# Patient Record
Sex: FEMALE | Race: WHITE | NOT HISPANIC OR LATINO | Employment: FULL TIME | URBAN - METROPOLITAN AREA
[De-identification: names, ages, dates, MRNs, and addresses within clinical notes are randomized per-mention and may not be internally consistent; named-entity substitution may affect disease eponyms.]

---

## 2017-12-06 ENCOUNTER — TRANSCRIBE ORDERS (OUTPATIENT)
Dept: MAMMOGRAPHY | Facility: HOSPITAL | Age: 47
End: 2017-12-06

## 2017-12-06 DIAGNOSIS — Z12.31 VISIT FOR SCREENING MAMMOGRAM: Primary | ICD-10-CM

## 2017-12-28 ENCOUNTER — HOSPITAL ENCOUNTER (OUTPATIENT)
Dept: MAMMOGRAPHY | Facility: HOSPITAL | Age: 47
Discharge: HOME | End: 2017-12-28
Attending: OBSTETRICS & GYNECOLOGY | Admitting: OBSTETRICS & GYNECOLOGY

## 2017-12-28 DIAGNOSIS — Z12.31 VISIT FOR SCREENING MAMMOGRAM: ICD-10-CM

## 2017-12-28 PROCEDURE — 77063 BREAST TOMOSYNTHESIS BI: CPT

## 2017-12-28 PROCEDURE — 77067 SCR MAMMO BI INCL CAD: CPT | Performed by: RADIOLOGY

## 2017-12-28 PROCEDURE — G0202 SCR MAMMO BI INCL CAD: HCPCS

## 2017-12-28 PROCEDURE — 77063 BREAST TOMOSYNTHESIS BI: CPT | Performed by: RADIOLOGY

## 2018-12-14 ENCOUNTER — TRANSCRIBE ORDERS (OUTPATIENT)
Dept: MAMMOGRAPHY | Facility: HOSPITAL | Age: 48
End: 2018-12-14

## 2018-12-14 DIAGNOSIS — Z12.31 VISIT FOR SCREENING MAMMOGRAM: Primary | ICD-10-CM

## 2019-01-02 ENCOUNTER — HOSPITAL ENCOUNTER (OUTPATIENT)
Dept: MAMMOGRAPHY | Facility: HOSPITAL | Age: 49
Discharge: HOME | End: 2019-01-02
Attending: OBSTETRICS & GYNECOLOGY | Admitting: OBSTETRICS & GYNECOLOGY

## 2019-01-02 DIAGNOSIS — Z12.31 VISIT FOR SCREENING MAMMOGRAM: ICD-10-CM

## 2019-01-02 PROCEDURE — 77067 SCR MAMMO BI INCL CAD: CPT

## 2019-01-02 PROCEDURE — 77063 BREAST TOMOSYNTHESIS BI: CPT

## 2019-01-02 PROCEDURE — 77067 SCR MAMMO BI INCL CAD: CPT | Performed by: RADIOLOGY

## 2019-01-02 PROCEDURE — 77063 BREAST TOMOSYNTHESIS BI: CPT | Performed by: RADIOLOGY

## 2019-05-13 ENCOUNTER — OFFICE VISIT (OUTPATIENT)
Dept: GASTROENTEROLOGY | Facility: CLINIC | Age: 49
End: 2019-05-13

## 2019-05-13 VITALS
HEART RATE: 76 BPM | BODY MASS INDEX: 24.89 KG/M2 | HEIGHT: 68 IN | DIASTOLIC BLOOD PRESSURE: 89 MMHG | SYSTOLIC BLOOD PRESSURE: 158 MMHG | WEIGHT: 164.2 LBS

## 2019-05-13 DIAGNOSIS — R10.13 EPIGASTRIC PAIN: Primary | ICD-10-CM

## 2019-05-13 DIAGNOSIS — K58.2 IRRITABLE BOWEL SYNDROME WITH BOTH CONSTIPATION AND DIARRHEA: ICD-10-CM

## 2019-05-13 DIAGNOSIS — R11.0 NAUSEA: ICD-10-CM

## 2019-05-13 PROCEDURE — 99204 OFFICE O/P NEW MOD 45 MIN: CPT | Performed by: INTERNAL MEDICINE

## 2019-05-13 RX ORDER — LIFITEGRAST 50 MG/ML
SOLUTION/ DROPS OPHTHALMIC
COMMUNITY
Start: 2019-04-30

## 2019-05-13 RX ORDER — OMEPRAZOLE 20 MG/1
CAPSULE, DELAYED RELEASE ORAL
COMMUNITY
Start: 2019-03-12 | End: 2019-09-24 | Stop reason: SDUPTHER

## 2019-05-13 RX ORDER — MONTELUKAST SODIUM 10 MG/1
TABLET ORAL
COMMUNITY

## 2019-05-13 RX ORDER — AMLODIPINE BESYLATE 5 MG/1
TABLET ORAL
COMMUNITY
Start: 2019-02-27

## 2019-05-13 RX ORDER — LEVOCETIRIZINE DIHYDROCHLORIDE 5 MG/1
TABLET, FILM COATED ORAL
COMMUNITY
Start: 2019-04-15

## 2019-05-13 RX ORDER — HYOSCYAMINE SULFATE 0.12 MG/1
TABLET SUBLINGUAL
Qty: 90 EACH | Refills: 4 | Status: SHIPPED | OUTPATIENT
Start: 2019-05-13 | End: 2023-02-06

## 2019-05-13 NOTE — PROGRESS NOTES
"GASTROENTEROLOGY OFFICE NOTE  Agnieszka Fontenot  [id number]  1970    CARE TEAM  Patient Care Team:  Nagi Mathews MD as PCP - General (Internal Medicine)  Provider, No Known as PCP - Family Medicine    No ref. provider found     Chief Complaint   Patient presents with   • GI Problem   • Heartburn   • Diarrhea   • Constipation   • Nausea        HISTORY OF PRESENT ILLNESS:  48-year-old white female presents with primary complaints of abdominal fullness, indigestion, diarrhea, constipation and nausea.  She is referred by PCP for further work-up and evaluation    Patient is a fairly healthy 48-year-old female with history of hypertension and prior GI history of cholecystectomy as well as prior endoscopic studies.    She states she had a cholecystectomy 2011 and from time to time she gets which she considers is a \"flareup \".  These episodes are occurring about 1-2 times per month and are characterized by epigastric fullness and pressure in the lower abdomen followed by diarrhea.  She is also quite nauseated and then after these episodes resolve she will have no bowel movement for about 2 or 3 days.  She does have problems in the morning practically diarrhea and this seems to happen after she eats cereal.    She admits to being under quite a bit of stress her  had a cerebrovascular accident following benign intracranial tumor removal and his difficulty coping with this has impacted her and her family tremendously.    PAST MEDICAL HISTORY  Past Medical History:   Diagnosis Date   • Hypertension         PAST SURGICAL HISTORY  Past Surgical History:   Procedure Laterality Date   • CHOLECYSTECTOMY     • ENDOSCOPY     • TONSILLECTOMY     • WISDOM TOOTH EXTRACTION          MEDICATIONS:    Current Outpatient Medications:   •  Unable to find, 1 each 1 (One) Time. Med Name: Hydroeyes, Disp: , Rfl:   •  ALLERGY SERUM INJECTION, Allergy  injections - monthly, Disp: , Rfl:   •  amLODIPine (NORVASC) 5 MG tablet, , Disp: " ", Rfl:   •  levocetirizine (XYZAL) 5 MG tablet, , Disp: , Rfl:   •  montelukast (SINGULAIR) 10 MG tablet, Singulair 10 mg tablet  Daily, Disp: , Rfl:   •  omeprazole (priLOSEC) 20 MG capsule, , Disp: , Rfl:   •  XIIDRA 5 % solution, , Disp: , Rfl:     ALLERGIES  No Known Allergies    FAMILY HISTORY:  Family History   Problem Relation Age of Onset   • Breast cancer Paternal Aunt         UNK   • Heart disease Father    • Stroke Maternal Grandfather    • Heart attack Paternal Grandmother    • Ovarian cancer Neg Hx        SOCIAL HISTORY  Social History     Socioeconomic History   • Marital status:      Spouse name: Not on file   • Number of children: Not on file   • Years of education: Not on file   • Highest education level: Not on file   Tobacco Use   • Smoking status: Never Smoker   • Smokeless tobacco: Never Used   Substance and Sexual Activity   • Alcohol use: No     Frequency: Never   • Drug use: No   • Sexual activity: Defer     Socioeconomic History:  She is .  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  She is a non-smoker and nondrinker.       REVIEW OF SYSTEMS  Review of Systems   Constitutional: Negative for unexpected weight loss.   HENT: Negative for trouble swallowing.    Eyes: Negative.    Respiratory: Negative.    Gastrointestinal: Positive for constipation, diarrhea, GERD and indigestion. Negative for abdominal distention, abdominal pain, anal bleeding, blood in stool, nausea, rectal pain and vomiting.   Endocrine: Negative.    Genitourinary: Negative.    Musculoskeletal: Negative.    Skin: Negative.    Allergic/Immunologic: Negative.    Neurological: Negative.    Hematological: Negative.    Psychiatric/Behavioral: Negative.      I reviewed the above ROS.    PHYSICAL EXAM   /89 (BP Location: Right arm, Patient Position: Sitting, Cuff Size: Adult)   Pulse 76   Ht 172.7 cm (68\")   Wt 74.5 kg (164 lb 3.2 oz)   BMI 24.97 kg/m²   General: Alert and oriented x 3. In no apparent or " acute distress.  and No stigmata of chronic liver disease  HEENT: Anicteric slcera. Normal oropharynx  Neck: Supple. Without lymphadenopathy  CV: Regular rate and rhythm, S1, S2  Lungs: Clear to ausculation. Without rales, robchi and wheezing  Abdomen:  Soft,non-distended without palpable masses or hepatosplenomeagaly, areas of rebound tenderness or guarding.   Extremeties: without clubbing, cyanosis or edema  Neurologic:  Alert and oriented x 3 without focal motor or sensory deficits  Rectal exam: deferred     No results found for this or any previous visit.     Results Review:  I reviewed the patient's new clinical results.      ASSESSMENT  1.-  Irritable bowel syndrome.  Episodic.  Nonprogressive.  No evidence of alarm symptoms to suggest pathology of concern.  2.-  Personal stressors with secondary  anxiety  3.-  Episodic nausea      PLAN  1.-Conservative management  2.-Initiate a low FODMAP diet  3.-Levsin sublingual as needed 1-2 every 4 hours for symptoms  4.-Follow-up appointment in 3 months and sooner should any new problems develop.      I discussed the patients findings and my recommendations with patient    Efrain Talbot MD  5/13/2019   3:00 PM    Much of this note is an electronic transcription of spoken language to printed text. Electronic transcription of spoken language may permit erroneous, nonsensical word phrases to be inadvertently transcribed.  Although I have reviewed the note for these errors, some may still be present.

## 2019-05-15 PROBLEM — R11.0 NAUSEA: Status: ACTIVE | Noted: 2019-05-15

## 2019-05-15 PROBLEM — R10.13 EPIGASTRIC PAIN: Status: ACTIVE | Noted: 2019-05-15

## 2019-05-15 PROBLEM — K58.2 IRRITABLE BOWEL SYNDROME WITH BOTH CONSTIPATION AND DIARRHEA: Status: ACTIVE | Noted: 2019-05-15

## 2019-09-24 ENCOUNTER — OFFICE VISIT (OUTPATIENT)
Dept: GASTROENTEROLOGY | Facility: CLINIC | Age: 49
End: 2019-09-24

## 2019-09-24 VITALS
HEIGHT: 68 IN | DIASTOLIC BLOOD PRESSURE: 74 MMHG | BODY MASS INDEX: 25.67 KG/M2 | SYSTOLIC BLOOD PRESSURE: 122 MMHG | WEIGHT: 169.4 LBS | HEART RATE: 84 BPM

## 2019-09-24 DIAGNOSIS — K58.2 IRRITABLE BOWEL SYNDROME WITH BOTH CONSTIPATION AND DIARRHEA: Primary | ICD-10-CM

## 2019-09-24 DIAGNOSIS — R11.0 NAUSEA: ICD-10-CM

## 2019-09-24 DIAGNOSIS — R10.13 EPIGASTRIC PAIN: ICD-10-CM

## 2019-09-24 PROCEDURE — 99212 OFFICE O/P EST SF 10 MIN: CPT | Performed by: INTERNAL MEDICINE

## 2019-09-24 RX ORDER — OMEPRAZOLE 20 MG/1
20 CAPSULE, DELAYED RELEASE ORAL DAILY
Qty: 90 CAPSULE | Refills: 3 | Status: SHIPPED | OUTPATIENT
Start: 2019-09-24 | End: 2020-09-08

## 2019-09-24 RX ORDER — SIMVASTATIN 10 MG
TABLET ORAL
COMMUNITY
Start: 2019-08-28

## 2019-09-24 RX ORDER — DOXYCYCLINE HYCLATE 20 MG
TABLET ORAL
COMMUNITY
Start: 2019-09-18 | End: 2020-11-30 | Stop reason: SDUPTHER

## 2019-09-24 NOTE — PROGRESS NOTES
GASTROENTEROLOGY OFFICE NOTE  Agnieszka Fontenot  [id number]  1970    CARE TEAM     Patient Care Team:  Nagi Mathews MD as PCP - General (Internal Medicine)  Provider, No Known as PCP - Family Medicine    No ref. provider found     Chief Complaint   Patient presents with   • Follow-up   • Abdominal Pain   • Irritable Bowel Syndrome        HISTORY OF PRESENT ILLNESS:  Patient presents for follow-up.  She is doing better.  She discontinued Slim fast and feels that this was causing a lot of her abdominal pain and diarrhea.  Going lactose-free did not really help.  Currently she is only having occasional problems with diarrhea maybe 2 or 3 times per month but she still has some mild epigastric pain which occurs when she is supine practically after a meal.  Levsin sublingual did seem to help.    When we last saw her she had abdominal fullness, indigestion, diarrhea constipation and nausea and, as indicated above these have improved.  She has a remote history of cholecystectomy.  She does have quite a bit of stress at home with her 's medical condition following a cerebrovascular accident following benign intracranial tumor removal.    PAST MEDICAL HISTORY  Past Medical History:   Diagnosis Date   • Hypertension         PAST SURGICAL HISTORY  Past Surgical History:   Procedure Laterality Date   • CHOLECYSTECTOMY     • ENDOSCOPY     • TONSILLECTOMY     • WISDOM TOOTH EXTRACTION          MEDICATIONS:    Current Outpatient Medications:   •  Loteprednol Etabonate (LOTEMAX OP), Apply  to eye(s) as directed by provider., Disp: , Rfl:   •  ALLERGY SERUM INJECTION, Allergy  injections - monthly, Disp: , Rfl:   •  amLODIPine (NORVASC) 5 MG tablet, , Disp: , Rfl:   •  doxycycline (PERIOSTAT) 20 MG tablet, , Disp: , Rfl:   •  Hyoscyamine Sulfate SL (LEVSIN/SL) 0.125 MG sublingual tablet, Place 1 tablet under the tongue and allow to dissolve. Once every 4 hours as needed., Disp: 90 each, Rfl: 4  •  levocetirizine  "(XYZAL) 5 MG tablet, , Disp: , Rfl:   •  montelukast (SINGULAIR) 10 MG tablet, Singulair 10 mg tablet  Daily, Disp: , Rfl:   •  omeprazole (priLOSEC) 20 MG capsule, , Disp: , Rfl:   •  simvastatin (ZOCOR) 10 MG tablet, , Disp: , Rfl:   •  Unable to find, 1 each 1 (One) Time. Med Name: Hydroeyes, Disp: , Rfl:   •  XIIDRA 5 % solution, , Disp: , Rfl:     ALLERGIES  No Known Allergies    FAMILY HISTORY:  Family History   Problem Relation Age of Onset   • Breast cancer Paternal Aunt         UNK   • Heart disease Father    • Stroke Maternal Grandfather    • Heart attack Paternal Grandmother    • Ovarian cancer Neg Hx    • Colon cancer Neg Hx    • Colon polyps Neg Hx        SOCIAL HISTORY  Social History     Socioeconomic History   • Marital status:      Spouse name: Not on file   • Number of children: Not on file   • Years of education: Not on file   • Highest education level: Not on file   Tobacco Use   • Smoking status: Never Smoker   • Smokeless tobacco: Never Used   Substance and Sexual Activity   • Alcohol use: No     Frequency: Never   • Drug use: No   • Sexual activity: No     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  Non-smoker.  Nondrinker.       REVIEW OF SYSTEMS  Review of Systems   Constitutional: Negative for activity change, appetite change, chills, diaphoresis, fatigue, fever, unexpected weight gain and unexpected weight loss.   HENT: Negative for trouble swallowing and voice change.    Gastrointestinal: Positive for abdominal pain. Negative for abdominal distention, anal bleeding, blood in stool, constipation, diarrhea, nausea, rectal pain, vomiting, GERD and indigestion.     I reviewed the above noted review of systems.  F2    PHYSICAL EXAM   /74 (BP Location: Right arm, Patient Position: Sitting, Cuff Size: Adult)   Pulse 84   Ht 172.7 cm (68\")   Wt 76.8 kg (169 lb 6.4 oz)   BMI 25.76 kg/m²   General: Alert and oriented x 3. In no apparent or acute distress.  and No " stigmata of chronic liver disease  HEENT: Anicteric slcera. Normal oropharynx  Neck: Supple. Without lymphadenopathy  CV: Regular rate and rhythm, S1, S2  Lungs: Clear to ausculation. Without rales, robchi and wheezing  Abdomen:  Soft,non-distended without palpable masses or hepatosplenomeagaly, areas of rebound tenderness or guarding.   Extremeties: without clubbing, cyanosis or edema  Neurologic:  Alert and oriented x 3 without focal motor or sensory deficits  Rectal exam: deferred     No results found for this or any previous visit.     Results Review:  I reviewed the patient's new clinical results.      ASSESSMENT  1.-  Irritable bowel syndrome doing better with dietary modifications and avoidance of Slim fast.  2.-  Occasional abdominal bloating.  Again, low FODMAP diet is recommended  3.-  Episodic nausea currently not problematic.  3.-    PLAN  1.-  Low FODMAP diet  2.-  PRN use of sublingual Levsin  3.-  GI follow-up as needed for refractory/recurrent symptoms.      I discussed the patients findings and my recommendations with patient    Efrain Talbot MD  9/24/2019   3:28 PM    Much of this note is an electronic transcription of spoken language to printed text. Electronic transcription of spoken language may permit erroneous, nonsensical word phrases to be inadvertently transcribed.  Although I have reviewed the note for these errors, some may still be present.

## 2019-10-25 ENCOUNTER — TELEPHONE (OUTPATIENT)
Dept: GASTROENTEROLOGY | Facility: CLINIC | Age: 49
End: 2019-10-25

## 2019-10-25 NOTE — TELEPHONE ENCOUNTER
----- Message from Efrain Talbot MD sent at 10/24/2019  2:19 PM EDT -----  Any protein drink  ----- Message -----  From: Manju Preston MA  Sent: 10/24/2019  11:34 AM  To: Efrain Talbot MD    Patient cant remember what alternative you suggested for weight loss instead of the slim fast because she was pretty sure it caused her symptoms

## 2019-10-25 NOTE — TELEPHONE ENCOUNTER
Called and notified patient per  any protein drink is fine but one of the ones he uses is muscle milk. Patient confirmed she understood and had no additional questions.

## 2019-12-20 ENCOUNTER — TRANSCRIBE ORDERS (OUTPATIENT)
Dept: MAMMOGRAPHY | Facility: HOSPITAL | Age: 49
End: 2019-12-20

## 2019-12-20 DIAGNOSIS — Z12.31 VISIT FOR SCREENING MAMMOGRAM: Primary | ICD-10-CM

## 2020-01-07 ENCOUNTER — HOSPITAL ENCOUNTER (OUTPATIENT)
Dept: MAMMOGRAPHY | Facility: HOSPITAL | Age: 50
Discharge: HOME | End: 2020-01-07
Admitting: OBSTETRICS & GYNECOLOGY

## 2020-01-07 DIAGNOSIS — Z12.31 VISIT FOR SCREENING MAMMOGRAM: ICD-10-CM

## 2020-01-07 PROCEDURE — 77063 BREAST TOMOSYNTHESIS BI: CPT

## 2020-01-07 PROCEDURE — 77067 SCR MAMMO BI INCL CAD: CPT

## 2020-01-07 PROCEDURE — 77063 BREAST TOMOSYNTHESIS BI: CPT | Performed by: RADIOLOGY

## 2020-01-07 PROCEDURE — 77067 SCR MAMMO BI INCL CAD: CPT | Performed by: RADIOLOGY

## 2020-02-06 ENCOUNTER — HOSPITAL ENCOUNTER (OUTPATIENT)
Dept: ULTRASOUND IMAGING | Facility: HOSPITAL | Age: 50
Discharge: HOME | End: 2020-02-06

## 2020-02-06 ENCOUNTER — HOSPITAL ENCOUNTER (OUTPATIENT)
Dept: MAMMOGRAPHY | Facility: HOSPITAL | Age: 50
Discharge: HOME | End: 2020-02-06
Admitting: RADIOLOGY

## 2020-02-06 ENCOUNTER — TRANSCRIBE ORDERS (OUTPATIENT)
Dept: MAMMOGRAPHY | Facility: HOSPITAL | Age: 50
End: 2020-02-06

## 2020-02-06 DIAGNOSIS — R92.8 ABNORMAL MAMMOGRAM: ICD-10-CM

## 2020-02-06 DIAGNOSIS — R92.8 ABNORMAL MAMMOGRAM: Primary | ICD-10-CM

## 2020-02-06 PROCEDURE — 76642 ULTRASOUND BREAST LIMITED: CPT

## 2020-02-06 PROCEDURE — 76642 ULTRASOUND BREAST LIMITED: CPT | Performed by: RADIOLOGY

## 2020-02-06 PROCEDURE — G0279 TOMOSYNTHESIS, MAMMO: HCPCS

## 2020-02-06 PROCEDURE — 77062 BREAST TOMOSYNTHESIS BI: CPT | Performed by: RADIOLOGY

## 2020-02-06 PROCEDURE — 77066 DX MAMMO INCL CAD BI: CPT | Performed by: RADIOLOGY

## 2020-02-06 PROCEDURE — 77066 DX MAMMO INCL CAD BI: CPT

## 2020-08-10 ENCOUNTER — HOSPITAL ENCOUNTER (OUTPATIENT)
Dept: ULTRASOUND IMAGING | Facility: HOSPITAL | Age: 50
Discharge: HOME | End: 2020-08-10

## 2020-08-10 ENCOUNTER — HOSPITAL ENCOUNTER (OUTPATIENT)
Dept: MAMMOGRAPHY | Facility: HOSPITAL | Age: 50
Discharge: HOME | End: 2020-08-10
Admitting: OBSTETRICS & GYNECOLOGY

## 2020-08-10 DIAGNOSIS — R92.8 ABNORMAL MAMMOGRAM: ICD-10-CM

## 2020-08-10 PROCEDURE — 77065 DX MAMMO INCL CAD UNI: CPT

## 2020-08-10 PROCEDURE — 76642 ULTRASOUND BREAST LIMITED: CPT | Performed by: RADIOLOGY

## 2020-08-10 PROCEDURE — 77065 DX MAMMO INCL CAD UNI: CPT | Performed by: RADIOLOGY

## 2020-08-10 PROCEDURE — 76642 ULTRASOUND BREAST LIMITED: CPT

## 2020-09-08 RX ORDER — OMEPRAZOLE 20 MG/1
20 CAPSULE, DELAYED RELEASE ORAL DAILY
Qty: 90 CAPSULE | Refills: 0 | Status: SHIPPED | OUTPATIENT
Start: 2020-09-08 | End: 2023-02-06

## 2020-11-30 ENCOUNTER — OFFICE VISIT (OUTPATIENT)
Dept: OBSTETRICS AND GYNECOLOGY | Facility: CLINIC | Age: 50
End: 2020-11-30

## 2020-11-30 VITALS
BODY MASS INDEX: 24.86 KG/M2 | DIASTOLIC BLOOD PRESSURE: 72 MMHG | HEIGHT: 68 IN | WEIGHT: 164 LBS | SYSTOLIC BLOOD PRESSURE: 109 MMHG

## 2020-11-30 DIAGNOSIS — Z01.419 WOMEN'S ANNUAL ROUTINE GYNECOLOGICAL EXAMINATION: Primary | ICD-10-CM

## 2020-11-30 PROCEDURE — 99396 PREV VISIT EST AGE 40-64: CPT | Performed by: NURSE PRACTITIONER

## 2020-11-30 NOTE — PROGRESS NOTES
GYN Annual Exam     CC - Here for annual exam.        HPI  Agnieszka Fontenot is a 50 y.o. female, , who presents for annual well woman exam. Patient's last menstrual period was 2020..  Periods are irregular.  Dysmenorrhea:none.  Patient reports problems with: none. There were no changes to her medical or surgical history since her last visit.. Partner Status: Marital Status: .  New Partners since last visit: no.  Desires STD Screening: no.    She reports having longer lasting cycles, heavier flow starting on day 2, and back pain instead of dysmenorrhea. She states her cycles are irregular since she stopped BC 2 years ago.     Additional OB/GYN History   Current contraception: contraceptive methods: Abstinence  Desires to: do not start contraception  Last Pap : 2019  Last Completed Pap Smear       Status Date      PAP SMEAR Done 2019 negative        History of abnormal Pap smear: no  Family history of uterine, colon, breast, or ovarian cancer: yes - Paternal aunt had breast cancer  Performs monthly Self-Breast Exam: yes  Last mammogram: 08/10/2020  Last Completed Mammogram       Status Date      MAMMOGRAM Done 8/10/2020 MAMMO DIAGNOSTIC LEFT W CAD     Patient has more history with this topic...         Exercises Regularly: yes  Feelings of Anxiety or Depression: no  Tobacco Usage?: No   OB History        2    Para   2    Term   2            AB        Living   2       SAB        TAB        Ectopic        Molar        Multiple        Live Births                    Health Maintenance   Topic Date Due   • Annual Gynecologic Pelvic and Breast Exam  1970   • COLONOSCOPY  1970   • ANNUAL PHYSICAL  1975   • TDAP/TD VACCINES (1 - Tdap) 1991   • HEPATITIS C SCREENING  2019   • INFLUENZA VACCINE  2020   • LIPID PANEL  10/28/2020   • MAMMOGRAM  08/10/2021   • PAP SMEAR  2022   • Pneumococcal Vaccine 0-64  Aged Out       The additional following  "portions of the patient's history were reviewed and updated as appropriate: allergies, current medications, past family history, past medical history, past social history, past surgical history and problem list.    Review of Systems   Constitutional: Negative.    HENT: Negative.    Eyes: Negative.    Respiratory: Negative.    Cardiovascular: Negative.    Gastrointestinal: Negative.    Endocrine: Negative.    Genitourinary: Positive for menstrual problem.   Musculoskeletal: Negative.    Skin: Negative.    Allergic/Immunologic: Negative.    Neurological: Negative.    Hematological: Negative.    Psychiatric/Behavioral: Negative.      All other systems reviewed and are negative.     I have reviewed and agree with the HPI, ROS, and historical information as entered above. Heather Crane MA    Objective   Ht 172.7 cm (68\")   Wt 74.4 kg (164 lb)   LMP 11/17/2020   Breastfeeding No   BMI 24.94 kg/m²     Physical Exam  Exam conducted with a chaperone present.   Constitutional:       Appearance: Normal appearance. She is normal weight.   Cardiovascular:      Rate and Rhythm: Normal rate and regular rhythm.   Chest:      Breasts:         Right: Normal.         Left: Normal.   Genitourinary:     General: Normal vulva.      Vagina: Normal.      Cervix: Normal.      Uterus: Normal.       Adnexa: Right adnexa normal and left adnexa normal.      Rectum: Normal.   Neurological:      Mental Status: She is alert.            Assessment and Plan    Problem List Items Addressed This Visit     None          1. GYN annual well woman exam.   2. Reviewed monthly self breast exams.  Instructed to call with lumps, pain, or breast discharge.    3. Recommended use of Vitamin D replacement and getting adequate calcium in her diet. (1500mg)  4. Reviewed exercise as a preventative health measures.   5. Reccommended Flu Vaccine in Fall of each year.  6. Symptoms of menopausal transition reviewed with patient.   7. RTC in 1 year or PRN with " problems.   8. Will monitor periods for now. Anupama menopause discussed at length.     Heather Crane MA  11/30/2020

## 2020-12-07 DIAGNOSIS — Z01.419 WOMEN'S ANNUAL ROUTINE GYNECOLOGICAL EXAMINATION: ICD-10-CM

## 2021-06-30 ENCOUNTER — TRANSCRIBE ORDERS (OUTPATIENT)
Dept: ADMINISTRATIVE | Facility: HOSPITAL | Age: 51
End: 2021-06-30

## 2021-06-30 DIAGNOSIS — Z12.31 VISIT FOR SCREENING MAMMOGRAM: Primary | ICD-10-CM

## 2021-08-11 ENCOUNTER — HOSPITAL ENCOUNTER (OUTPATIENT)
Dept: MAMMOGRAPHY | Facility: HOSPITAL | Age: 51
Discharge: HOME | End: 2021-08-11
Admitting: RADIOLOGY

## 2021-08-11 ENCOUNTER — APPOINTMENT (OUTPATIENT)
Dept: MAMMOGRAPHY | Facility: HOSPITAL | Age: 51
End: 2021-08-11

## 2021-08-11 DIAGNOSIS — R92.8 ABNORMAL MAMMOGRAM: ICD-10-CM

## 2021-08-11 PROCEDURE — G0279 TOMOSYNTHESIS, MAMMO: HCPCS

## 2021-08-11 PROCEDURE — 77066 DX MAMMO INCL CAD BI: CPT | Performed by: RADIOLOGY

## 2021-08-11 PROCEDURE — 77066 DX MAMMO INCL CAD BI: CPT

## 2021-08-11 PROCEDURE — 77062 BREAST TOMOSYNTHESIS BI: CPT | Performed by: RADIOLOGY

## 2022-01-05 ENCOUNTER — OFFICE VISIT (OUTPATIENT)
Dept: OBSTETRICS AND GYNECOLOGY | Facility: CLINIC | Age: 52
End: 2022-01-05

## 2022-01-05 VITALS — DIASTOLIC BLOOD PRESSURE: 60 MMHG | WEIGHT: 161.8 LBS | BODY MASS INDEX: 24.6 KG/M2 | SYSTOLIC BLOOD PRESSURE: 102 MMHG

## 2022-01-05 DIAGNOSIS — Z01.419 ENCOUNTER FOR ANNUAL ROUTINE GYNECOLOGICAL EXAMINATION: Primary | ICD-10-CM

## 2022-01-05 PROCEDURE — 99396 PREV VISIT EST AGE 40-64: CPT | Performed by: NURSE PRACTITIONER

## 2022-01-05 RX ORDER — METHOCARBAMOL 750 MG/1
TABLET, FILM COATED ORAL EVERY 8 HOURS SCHEDULED
COMMUNITY
End: 2023-02-06

## 2022-01-05 NOTE — PROGRESS NOTES
GYN Annual Exam     CC - Here for annual exam.        HPI  Agnieszka Fontenot is a 51 y.o. female, , who presents for annual well woman exam. Patient's last menstrual period was 10/31/2021 (approximate). Periods are irregular and sporadic.  Dysmenorrhea:none.  Patient reports problems with: none. There were no changes to her medical or surgical history since her last visit.. Partner Status: Marital Status: .  New Partners since last visit: no.  Desires STD Screening: no.    Additional OB/GYN History   Current contraception: contraceptive methods: Abstinence  Desires to: do not start contraception  Last Pap : 2020- pap w/ reflex- negative  Last Completed Pap Smear          Ordered - PAP SMEAR (Every 3 Years) Ordered on 2020  Pap IG, Rfx HPV ASCU    2019  Done - negative              History of abnormal Pap smear: no  Family history of uterine, colon, breast, or ovarian cancer: yes - PAunt- Breast  Performs monthly Self-Breast Exam: intermittently  Last mammogram: 2021. Done at [facility].    Last Completed Mammogram          MAMMOGRAM (Yearly) Next due on 2021  Mammo Diagnostic Digital Tomosynthesis Bilateral With CAD    08/10/2020  Mammo Diagnostic Left With CAD    2020  Mammo Diagnostic Digital Tomosynthesis Bilateral With CAD    2020  Mammo Screening Digital Tomosynthesis Bilateral With CAD    2019  Mammo Screening Digital Tomosynthesis Bilateral With CAD    Only the first 5 history entries have been loaded, but more history exists.               Exercises Regularly: yes  Feelings of Anxiety or Depression: no  Tobacco Usage?: No   OB History        2    Para   2    Term   2            AB        Living   2       SAB        IAB        Ectopic        Molar        Multiple        Live Births                    Health Maintenance   Topic Date Due   • COLORECTAL CANCER SCREENING  Never done   • ANNUAL PHYSICAL  Never  done   • TDAP/TD VACCINES (1 - Tdap) Never done   • HEPATITIS C SCREENING  Never done   • LIPID PANEL  Never done   • INFLUENZA VACCINE  Never done   • Annual Gynecologic Pelvic and Breast Exam  12/01/2021   • MAMMOGRAM  08/11/2022   • PAP SMEAR  12/07/2023   • COVID-19 Vaccine  Completed   • Pneumococcal Vaccine 0-64  Aged Out       The additional following portions of the patient's history were reviewed and updated as appropriate: allergies, current medications, past family history, past medical history, past social history, past surgical history and problem list.    Review of Systems   Constitutional: Negative.    Cardiovascular: Negative.    Gastrointestinal: Negative.    Genitourinary: Positive for menstrual problem (irregular periods).   Musculoskeletal: Negative.    Psychiatric/Behavioral: Negative.          I have reviewed and agree with the HPI, ROS, and historical information as entered above. Marilou Stanford, APRN    Objective   /60   Wt 73.4 kg (161 lb 12.8 oz)   LMP 10/31/2021 (Approximate)   Breastfeeding No   BMI 24.60 kg/m²     Physical Exam  Vitals and nursing note reviewed. Exam conducted with a chaperone present.   Constitutional:       Appearance: She is well-developed and normal weight.   HENT:      Head: Normocephalic and atraumatic.   Neck:      Thyroid: No thyroid mass or thyromegaly.   Cardiovascular:      Rate and Rhythm: Normal rate and regular rhythm.      Heart sounds: No murmur heard.      Pulmonary:      Effort: Pulmonary effort is normal. No retractions.      Breath sounds: Normal breath sounds. No wheezing, rhonchi or rales.   Chest:      Chest wall: No mass or tenderness.   Breasts:      Right: Normal. No mass, nipple discharge, skin change or tenderness.      Left: Normal. No mass, nipple discharge, skin change or tenderness.       Abdominal:      Palpations: Abdomen is soft. Abdomen is not rigid. There is no mass.      Tenderness: There is no guarding.      Hernia: No  hernia is present.   Genitourinary:     General: Normal vulva.      Labia:         Right: No rash, tenderness or lesion.         Left: No rash, tenderness or lesion.       Vagina: Normal. No vaginal discharge or lesions.      Cervix: No cervical motion tenderness, discharge, lesion or cervical bleeding.      Uterus: Normal. Not enlarged, not fixed and not tender.       Adnexa: Right adnexa normal and left adnexa normal.        Right: No mass or tenderness.          Left: No mass or tenderness.        Rectum: Normal. No external hemorrhoid.   Musculoskeletal:      Cervical back: Normal range of motion. No muscular tenderness.   Neurological:      Mental Status: She is alert and oriented to person, place, and time.   Psychiatric:         Behavior: Behavior normal.            Assessment and Plan    Problem List Items Addressed This Visit     None      Visit Diagnoses     Encounter for annual routine gynecological examination    -  Primary    Relevant Orders    Pap IG, HPV-hr          1. GYN annual well woman exam.  2. Next mammogram due after 8/11/22  3. Discussed screening colonoscopy. Plans to have this done after turns age 52. Will call for an order for this after her [birth year] in 7/2020.  4. Will monitor periods for now. Perimenopause and menopause symptoms reviewed.    5. Reviewed monthly self breast exams.  Instructed to call with lumps, pain, or breast discharge.    6. Reviewed exercise as a preventative health measures.   7. Reccommended Flu Vaccine in Fall of each year.  8. RTC in 1 year or PRN with problems.      Marilou Stanford, GRISELDA  01/05/2022

## 2022-01-14 DIAGNOSIS — Z01.419 ENCOUNTER FOR ANNUAL ROUTINE GYNECOLOGICAL EXAMINATION: ICD-10-CM

## 2022-10-21 ENCOUNTER — TRANSCRIBE ORDERS (OUTPATIENT)
Dept: ADMINISTRATIVE | Facility: HOSPITAL | Age: 52
End: 2022-10-21

## 2022-10-21 DIAGNOSIS — Z12.31 VISIT FOR SCREENING MAMMOGRAM: Primary | ICD-10-CM

## 2022-10-27 ENCOUNTER — APPOINTMENT (OUTPATIENT)
Dept: MAMMOGRAPHY | Facility: HOSPITAL | Age: 52
End: 2022-10-27

## 2022-12-02 ENCOUNTER — HOSPITAL ENCOUNTER (OUTPATIENT)
Dept: MAMMOGRAPHY | Facility: HOSPITAL | Age: 52
Discharge: HOME | End: 2022-12-02
Admitting: NURSE PRACTITIONER

## 2022-12-02 DIAGNOSIS — Z12.31 VISIT FOR SCREENING MAMMOGRAM: ICD-10-CM

## 2022-12-02 PROCEDURE — 77063 BREAST TOMOSYNTHESIS BI: CPT | Performed by: RADIOLOGY

## 2022-12-02 PROCEDURE — 77063 BREAST TOMOSYNTHESIS BI: CPT

## 2022-12-02 PROCEDURE — 77067 SCR MAMMO BI INCL CAD: CPT | Performed by: RADIOLOGY

## 2022-12-02 PROCEDURE — 77067 SCR MAMMO BI INCL CAD: CPT

## 2023-02-06 ENCOUNTER — OFFICE VISIT (OUTPATIENT)
Dept: OBSTETRICS AND GYNECOLOGY | Facility: CLINIC | Age: 53
End: 2023-02-06
Payer: COMMERCIAL

## 2023-02-06 VITALS
SYSTOLIC BLOOD PRESSURE: 104 MMHG | WEIGHT: 168.47 LBS | DIASTOLIC BLOOD PRESSURE: 62 MMHG | BODY MASS INDEX: 25.62 KG/M2

## 2023-02-06 DIAGNOSIS — Z01.419 ROUTINE GYNECOLOGICAL EXAMINATION: Primary | ICD-10-CM

## 2023-02-06 PROCEDURE — 99396 PREV VISIT EST AGE 40-64: CPT | Performed by: NURSE PRACTITIONER

## 2023-02-06 RX ORDER — CIMETIDINE 300 MG/1
TABLET, FILM COATED ORAL
COMMUNITY
Start: 2023-01-15

## 2023-02-06 NOTE — PROGRESS NOTES
Gynecologic Annual Exam Note          GYN Annual Exam     Annual        Subjective     HPI  Agnieszka Fontenot is a 52 y.o. female, , who presents for annual well woman exam as a established patient . Patient's last menstrual period was 10/03/2022 (exact date)..  Patient reports problems with: none.  Her menses are rare, and sporadic. She reports dysmenorrhea is none. Partner Status: Marital Status: . She is is not currently sexually active. STD testing recommendations have been explained to the patient and she does not desire STD testing. There were no changes to her medical or surgical history since her last visit..  LMP 10/2022.  Prior to that she has skipped a few periods throughout .           Additional OB/GYN History   Current contraception: contraceptive methods: Abstinence  Desires to: do not start contraception    Last Pap : 2022. Result: negative. HPV: negative.   Last Completed Pap Smear          PAP SMEAR (Every 3 Years) Next due on 2022  SCANNED - PAP SMEAR    2020  Pap IG, Rfx HPV ASCU    2019  Done - negative              History of abnormal Pap smear: no  Family history of uterine, colon, breast, or ovarian cancer: yes - Paternal aunt- breast  Performs monthly Self-Breast Exam: yes  Last mammogram: 2022. Done at [facility].    Last Completed Mammogram          MAMMOGRAM (Yearly) Next due on 2022  Mammo Screening Digital Tomosynthesis Bilateral With CAD    2021  Mammo Diagnostic Digital Tomosynthesis Bilateral With CAD    08/10/2020  Mammo Diagnostic Left With CAD    2020  Mammo Diagnostic Digital Tomosynthesis Bilateral With CAD    2020  Mammo Screening Digital Tomosynthesis Bilateral With CAD    Only the first 5 history entries have been loaded, but more history exists.                History of abnormal mammogram: yes    Colonoscopy: has never had a colonoscopy.   Exercises Regularly: yes  Feelings  of Anxiety or Depression: no  Tobacco Usage?: No       Current Outpatient Medications:   •  ALLERGY SERUM INJECTION, Allergy  injections - monthly, Disp: , Rfl:   •  amLODIPine (NORVASC) 5 MG tablet, , Disp: , Rfl:   •  cimetidine (TAGAMET) 300 MG tablet, , Disp: , Rfl:   •  levocetirizine (XYZAL) 5 MG tablet, , Disp: , Rfl:   •  montelukast (SINGULAIR) 10 MG tablet, Singulair 10 mg tablet  Daily, Disp: , Rfl:   •  simvastatin (ZOCOR) 10 MG tablet, , Disp: , Rfl:   •  XIIDRA 5 % solution, , Disp: , Rfl:      Patient denies the need for medication refills today.    OB History        2    Para   2    Term   2            AB        Living   2       SAB        IAB        Ectopic        Molar        Multiple        Live Births                    Past Medical History:   Diagnosis Date   • History of degenerative disc disease    • History of mammogram 2019-negative   • Hypercholesterolemia    • Hypertension    • Indigestion     dx with dyspepsia   • Papanicolaou smear 2018-negative   • Respiratory disorder     reactive airway disease   • Scoliosis    • Screening breast examination     self admits   • Visit for oral contraceptive prescription 2011        Past Surgical History:   Procedure Laterality Date   • BACK SURGERY  1998    blood transfusion, Kevan Rods   •  SECTION     • CHOLECYSTECTOMY  10/2011    no comp   • ENDOSCOPY      EGD   • MOUTH SURGERY     • TONSILLECTOMY     • WISDOM TOOTH EXTRACTION         Health Maintenance   Topic Date Due   • COLORECTAL CANCER SCREENING  Never done   • TDAP/TD VACCINES (1 - Tdap) Never done   • HEPATITIS C SCREENING  Never done   • ANNUAL PHYSICAL  Never done   • LIPID PANEL  Never done   • COVID-19 Vaccine (4 - Booster for Pfizer series) 2021   • ZOSTER VACCINE (1 of 2) Never done   • Annual Gynecologic Pelvic and Breast Exam  2023   • MAMMOGRAM  2023   • PAP SMEAR  2025   • INFLUENZA  VACCINE  Completed   • Pneumococcal Vaccine 0-64  Aged Out       The additional following portions of the patient's history were reviewed and updated as appropriate: allergies, current medications, past family history, past medical history, past social history, past surgical history and problem list.    Review of Systems   Constitutional: Negative.    Cardiovascular: Negative.    Gastrointestinal: Negative.    Genitourinary: Negative.    Psychiatric/Behavioral: Negative.          I have reviewed and agree with the HPI, ROS, and historical information as entered above. Marilou Stanford, APRN      Objective   /62   Wt 76.4 kg (168 lb 7.5 oz)   LMP 10/03/2022 (Exact Date)   BMI 25.62 kg/m²     Physical Exam  Vitals and nursing note reviewed. Exam conducted with a chaperone present.   Constitutional:       Appearance: She is well-developed.   HENT:      Head: Normocephalic and atraumatic.   Neck:      Thyroid: No thyroid mass or thyromegaly.   Cardiovascular:      Rate and Rhythm: Normal rate and regular rhythm.      Heart sounds: No murmur heard.  Pulmonary:      Effort: Pulmonary effort is normal. No retractions.      Breath sounds: Normal breath sounds. No wheezing, rhonchi or rales.   Chest:      Chest wall: No mass or tenderness.   Breasts:     Breasts are asymmetrical.      Right: Normal. No mass, nipple discharge, skin change or tenderness.      Left: Normal. No mass, nipple discharge, skin change or tenderness.   Abdominal:      Palpations: Abdomen is soft. Abdomen is not rigid. There is no mass.      Tenderness: There is no abdominal tenderness. There is no guarding.      Hernia: No hernia is present.   Genitourinary:     General: Normal vulva.      Labia:         Right: No rash, tenderness or lesion.         Left: No rash, tenderness or lesion.       Urethra: No prolapse, urethral pain, urethral swelling or urethral lesion.      Vagina: Normal. No vaginal discharge or lesions.      Cervix: No cervical  motion tenderness, discharge, lesion or cervical bleeding.      Uterus: Normal. Not enlarged, not fixed and not tender.       Adnexa: Right adnexa normal and left adnexa normal.        Right: No mass or tenderness.          Left: No mass or tenderness.        Rectum: Normal. No external hemorrhoid.   Musculoskeletal:      Cervical back: Normal range of motion. No muscular tenderness.   Neurological:      Mental Status: She is alert and oriented to person, place, and time.   Psychiatric:         Behavior: Behavior normal.            Assessment and Plan    Problem List Items Addressed This Visit    None  Visit Diagnoses     Routine gynecological examination    -  Primary          1. GYN annual well woman exam.   2. Age 52, discussed screening colonoscopy recommendation  3. Had normal mammogram 12/2022  4. Perimenopausal, LMP 10/2022  5. Pap guidelines reviewed.  6. Reviewed monthly self breast exams.  Instructed to call with lumps, pain, or breast discharge.    7. Recommended use of Vitamin D replacement and getting adequate calcium in her diet. (1500mg)  8. Reviewed exercise as a preventative health measures.   9. Reccommended Flu Vaccine in Fall of each year.  10. RTC in 1 year or PRN with problems.       Marilou Stanford, APRN  02/06/2023

## 2023-05-22 RX ORDER — SODIUM, POTASSIUM,MAG SULFATES 17.5-3.13G
2 SOLUTION, RECONSTITUTED, ORAL ORAL TAKE AS DIRECTED
Qty: 354 ML | Refills: 0 | Status: SHIPPED | OUTPATIENT
Start: 2023-05-22

## 2023-05-26 ENCOUNTER — OUTSIDE FACILITY SERVICE (OUTPATIENT)
Dept: GASTROENTEROLOGY | Facility: CLINIC | Age: 53
End: 2023-05-26

## 2023-05-26 PROCEDURE — 88305 TISSUE EXAM BY PATHOLOGIST: CPT

## 2023-05-26 PROCEDURE — 45385 COLONOSCOPY W/LESION REMOVAL: CPT | Performed by: INTERNAL MEDICINE

## 2023-05-30 ENCOUNTER — LAB REQUISITION (OUTPATIENT)
Dept: LAB | Facility: HOSPITAL | Age: 53
End: 2023-05-30
Payer: COMMERCIAL

## 2023-05-30 DIAGNOSIS — D12.3 BENIGN NEOPLASM OF TRANSVERSE COLON: ICD-10-CM

## 2023-05-30 DIAGNOSIS — D12.8 BENIGN NEOPLASM OF RECTUM: ICD-10-CM

## 2023-05-30 DIAGNOSIS — Z12.11 ENCOUNTER FOR SCREENING FOR MALIGNANT NEOPLASM OF COLON: ICD-10-CM

## 2023-05-31 LAB — REF LAB TEST METHOD: NORMAL

## 2023-11-14 ENCOUNTER — OUTSIDE FACILITY SERVICE (OUTPATIENT)
Dept: GASTROENTEROLOGY | Facility: CLINIC | Age: 53
End: 2023-11-14
Payer: COMMERCIAL

## 2023-11-14 DIAGNOSIS — R19.7 DIARRHEA, UNSPECIFIED TYPE: Primary | ICD-10-CM

## 2023-11-14 PROCEDURE — 88305 TISSUE EXAM BY PATHOLOGIST: CPT

## 2023-11-14 PROCEDURE — 43239 EGD BIOPSY SINGLE/MULTIPLE: CPT | Performed by: INTERNAL MEDICINE

## 2023-11-14 RX ORDER — OMEPRAZOLE 40 MG/1
40 CAPSULE, DELAYED RELEASE ORAL
Qty: 60 CAPSULE | Refills: 11 | Status: SHIPPED | OUTPATIENT
Start: 2023-11-14

## 2023-11-15 ENCOUNTER — LAB REQUISITION (OUTPATIENT)
Dept: LAB | Facility: HOSPITAL | Age: 53
End: 2023-11-15
Payer: COMMERCIAL

## 2023-11-15 DIAGNOSIS — K21.9 GASTRO-ESOPHAGEAL REFLUX DISEASE WITHOUT ESOPHAGITIS: ICD-10-CM

## 2023-11-15 DIAGNOSIS — R12 HEARTBURN: ICD-10-CM

## 2023-11-16 LAB — REF LAB TEST METHOD: NORMAL

## 2024-01-02 ENCOUNTER — OFFICE VISIT (OUTPATIENT)
Dept: GASTROENTEROLOGY | Facility: CLINIC | Age: 54
End: 2024-01-02
Payer: COMMERCIAL

## 2024-01-02 VITALS
WEIGHT: 175.2 LBS | DIASTOLIC BLOOD PRESSURE: 99 MMHG | BODY MASS INDEX: 26.55 KG/M2 | HEIGHT: 68 IN | SYSTOLIC BLOOD PRESSURE: 148 MMHG

## 2024-01-02 DIAGNOSIS — Z86.010 HISTORY OF ADENOMATOUS POLYP OF COLON: ICD-10-CM

## 2024-01-02 DIAGNOSIS — K21.9 GASTROESOPHAGEAL REFLUX DISEASE WITHOUT ESOPHAGITIS: Primary | ICD-10-CM

## 2024-01-02 PROCEDURE — 99213 OFFICE O/P EST LOW 20 MIN: CPT | Performed by: INTERNAL MEDICINE

## 2024-01-02 RX ORDER — TIMOLOL MALEATE 5 MG/ML
1 SOLUTION/ DROPS OPHTHALMIC DAILY
COMMUNITY
Start: 2023-11-30

## 2024-01-02 RX ORDER — LATANOPROST 50 UG/ML
1 SOLUTION/ DROPS OPHTHALMIC NIGHTLY
COMMUNITY
Start: 2023-12-02
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
## 2024-01-07 PROBLEM — Z86.0101 HISTORY OF ADENOMATOUS POLYP OF COLON: Status: ACTIVE | Noted: 2024-01-07

## 2024-01-07 PROBLEM — K21.9 GASTROESOPHAGEAL REFLUX DISEASE WITHOUT ESOPHAGITIS: Status: ACTIVE | Noted: 2024-01-07

## 2024-01-07 PROBLEM — Z86.010 HISTORY OF ADENOMATOUS POLYP OF COLON: Status: ACTIVE | Noted: 2024-01-07

## 2024-01-09 ENCOUNTER — TRANSCRIBE ORDERS (OUTPATIENT)
Dept: ADMINISTRATIVE | Facility: HOSPITAL | Age: 54
End: 2024-01-09
Payer: COMMERCIAL

## 2024-01-09 DIAGNOSIS — Z12.31 VISIT FOR SCREENING MAMMOGRAM: Primary | ICD-10-CM

## 2024-02-07 ENCOUNTER — OFFICE VISIT (OUTPATIENT)
Dept: OBSTETRICS AND GYNECOLOGY | Facility: CLINIC | Age: 54
End: 2024-02-07
Payer: COMMERCIAL

## 2024-02-07 VITALS
HEIGHT: 68 IN | WEIGHT: 174.2 LBS | SYSTOLIC BLOOD PRESSURE: 112 MMHG | BODY MASS INDEX: 26.4 KG/M2 | DIASTOLIC BLOOD PRESSURE: 60 MMHG

## 2024-02-07 DIAGNOSIS — E66.3 OVERWEIGHT (BMI 25.0-29.9): ICD-10-CM

## 2024-02-07 DIAGNOSIS — Z01.419 ROUTINE GYNECOLOGICAL EXAMINATION: Primary | ICD-10-CM

## 2024-02-07 PROBLEM — E78.5 HYPERLIPIDEMIA: Status: ACTIVE | Noted: 2023-12-04

## 2024-02-07 PROBLEM — I10 ESSENTIAL HYPERTENSION: Status: ACTIVE | Noted: 2023-12-04
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
## 2024-03-05 ENCOUNTER — HOSPITAL ENCOUNTER (OUTPATIENT)
Dept: MAMMOGRAPHY | Facility: HOSPITAL | Age: 54
Discharge: HOME | End: 2024-03-05
Admitting: ADVANCED PRACTICE MIDWIFE
Payer: COMMERCIAL

## 2024-03-05 DIAGNOSIS — Z12.31 VISIT FOR SCREENING MAMMOGRAM: ICD-10-CM

## 2024-03-05 PROCEDURE — 77063 BREAST TOMOSYNTHESIS BI: CPT

## 2024-03-05 PROCEDURE — 77067 SCR MAMMO BI INCL CAD: CPT

## 2024-04-24 ENCOUNTER — OFFICE VISIT (OUTPATIENT)
Dept: BARIATRICS/WEIGHT MGMT | Facility: CLINIC | Age: 54
End: 2024-04-24
Payer: COMMERCIAL

## 2024-04-24 VITALS
DIASTOLIC BLOOD PRESSURE: 74 MMHG | WEIGHT: 170.8 LBS | HEIGHT: 68 IN | OXYGEN SATURATION: 98 % | BODY MASS INDEX: 25.88 KG/M2 | SYSTOLIC BLOOD PRESSURE: 112 MMHG | HEART RATE: 68 BPM | RESPIRATION RATE: 16 BRPM

## 2024-04-24 DIAGNOSIS — Z51.81 ENCOUNTER FOR THERAPEUTIC DRUG LEVEL MONITORING: ICD-10-CM

## 2024-04-24 DIAGNOSIS — R53.83 FATIGUE, UNSPECIFIED TYPE: ICD-10-CM

## 2024-04-24 DIAGNOSIS — E66.3 OVERWEIGHT (BMI 25.0-29.9): Primary | ICD-10-CM

## 2024-04-24 DIAGNOSIS — K21.9 GASTROESOPHAGEAL REFLUX DISEASE WITHOUT ESOPHAGITIS: ICD-10-CM

## 2024-04-24 DIAGNOSIS — I10 ESSENTIAL HYPERTENSION: ICD-10-CM

## 2024-04-24 DIAGNOSIS — E78.5 HYPERLIPIDEMIA, UNSPECIFIED HYPERLIPIDEMIA TYPE: ICD-10-CM

## 2024-04-24 RX ORDER — CHLORAL HYDRATE 500 MG
1000 CAPSULE ORAL 2 TIMES DAILY WITH MEALS
Start: 2024-04-24

## 2024-04-24 NOTE — ASSESSMENT & PLAN NOTE
On Simvastatin 10 mg daily. No recent lipid for review. Will obtain Lipid today. Continue current plan.

## 2024-04-24 NOTE — ASSESSMENT & PLAN NOTE
She has felt more tired recently stating she can take afternoon naps. She has poor sleep pattern, < 6 hrs nightly given she wakes several times to take care of her . Labs today. Consider B12 shot next visit.

## 2024-04-24 NOTE — PROGRESS NOTES
[facility]  [address]  [place] [address]       Date: 2024  Patient Name: Agnieszka Fontenot  MRN: [id number]  : 1970    Subjective     Chief Complaint  Obesity Management consult, nutrition counseling          Agnieszka Fontenot presents to [facility] for obesity management as a referral from Yasmin Simpson. She has PMH significant for HTN, HLD, GERD, IBS. She presents today for weight counseling as she feels she has recently gained undesired weight and has been having trouble with losing this. She feels possibly there is a correlation with issac-menopause. She would like to improve health and fell better in her clothes. She aspires to lose 15-20 lbs and for her clothes to fit better. Current diet see below is high in carbohydrates and low in protein.     Weight history:  Highest lifetime weight: 175 pounds. Today's weight is 77.5 kg (170 lb 12.8 oz) pounds.   Weight 5 years ago: 165  She is at her heaviest lifetime weight at this time. She denies historical struggle with her weight. She feels her increase in abdominal weight may be related to perimenopause. She desires to lose 15-2- lbs.     Current lifestyle:   The following seem to sabotage weight loss efforts:Lack of time for planning & self, social events, skipping meals, eating late, specific cravings like carbohydrates, mindless eating (snacking while working or watching TV), and convenience food (fast food)  She currently works full time, long hours. She cares for  at home, stroke with unilateral paralysis. She is getting up multiple times per night to help with . On average she gets less than 6 hrs of sleep per night.  She does exercise, treadmill every morning prior to work about 20 minutes 5-7 days per week. She packs her lunch daily however eats cereal for breakfast daily and may also have a muffin and banana or peanut butter and crackers as mid morning  snack. Lunch may include healthy choice steamables, ricearoni rice, rarely may sandwich, soup and fruit. Dinner typically consists of instapot meal such as chicken and rice, chicken and noodle casserole, chicken and potatos, tacos. She appreciates eating a lot of chicken.  Fridays she has pizza. She rarely has pm snack prior to bedtime. Primarily drinks water and flavors with low calorie flavoring packets, 64 oz of water daily. She does admit to some stress eating at times.     Pertinent medical history:  Hypertension- On Norvasc managed by PCP  Hyperlipidemia- On Zocor managed by PCP  IBS/nausea- Sees GI (Dr. Talbot), Had EGD, colonoscopy in lat year which were normal  GERD- on PPI, she states is controlled at this time. Sees GI.     Pancreatitis: No   Glaucoma: No, however eye pressures are elevated, at risk for glaucoma  Headaches: Yes, infrequent   HTN:  Yes  Heart palpitations: No  Thyroid C cell cancer: No  MEN syndrome personal or family hx: No  Nephrolithiasis: Yes, 2015, lithotripsy, isolated event          Pertinent family history:  Family History   Problem Relation Age of Onset    Arthritis Mother     Hypertension Mother     Heart disease Father     Arthritis Father     Hyperlipidemia Father     Hypertension Father     Thyroid disease Father     Depression Father     Arthritis Sister     Depression Sister     Depression Daughter     Breast cancer Paternal Aunt         UNK, still living, dx in her 40's    Heart disease Paternal Aunt     Heart disease Paternal Uncle     Stroke Maternal Grandfather     Heart attack Paternal Grandmother     Heart disease Paternal Grandmother     Osteoporosis Paternal Grandmother     Thyroid disease Paternal Grandmother     Heart disease Paternal Grandfather     Cancer Paternal Grandfather     Arthritis Paternal Grandfather     Ovarian cancer Neg Hx     Colon cancer Neg Hx     Colon polyps Neg Hx        Review of Systems   Constitutional:  Positive for unexpected weight  "gain. Negative for fatigue.        Positive for weight gain   HENT:  Negative for trouble swallowing.         Negative for throat swelling   Respiratory:  Negative for shortness of breath and wheezing.         Negative for snoring   Cardiovascular:  Negative for chest pain, palpitations and leg swelling.   Gastrointestinal:  Positive for indigestion. Negative for abdominal pain, constipation, diarrhea and GERD.   Endocrine: Negative for cold intolerance, heat intolerance, polydipsia, polyphagia and polyuria.        Negative for loss of hair  Negative for hirsutism     Genitourinary:  Positive for menstrual problem.        Denies menstrual irregularities   Musculoskeletal:  Negative for arthralgias.        Denies exercise limitations  Denies chronic pain   Skin:  Negative for dry skin.        Negative for acne   Neurological:  Negative for headache and memory problem.        Negative for paresthesias   Psychiatric/Behavioral:  Negative for self-injury, sleep disturbance, suicidal ideas and depressed mood. The patient is not nervous/anxious.    All other systems reviewed and are negative.      PHQ-9 Total Score: 1       Objective     Body mass index is 25.97 kg/m².   Body composition analysis completed and showed:   Body Fat %: 40.0     Measurements (in inches)  Measurements (in inches) Waist Circumference: 45   Neck: 13.5  Chest: 37.5  Hips: 45  Thighs: 40    Vital Signs:   /74 (BP Location: Left arm, Patient Position: Sitting)   Pulse 68   Resp 16   Ht 172.7 cm (68\")   Wt 77.5 kg (170 lb 12.8 oz)   SpO2 98%   BMI 25.97 kg/m²     Physical Exam   General appears stated age and normal appearance   HEENT PERRLA, EOM intact, and conjunctivae normal   Chest/lungs Normal rate, Regular rhythm, Breathing is unlabored, and Clear to auscultation bilaterally   Abdomen Soft, normal bowel sounds, without mass or tenderness   Extremities without edema   Neuro Good historian and No focal deficit   Skin Warm, dry, intact "   Psych normal behavior, normal thought content, and normal concentration     Result Review :                   Assessment / Plan       Diagnoses and all orders for this visit:    1. Overweight (BMI 25.0-29.9) (Primary)  Overview:  Start weight: (170): 1st goal (10% weight loss): 153 Target Goal: 154  Current medication prescribed by MWM:   Rx Options: All  Rx Caution: Topamax (Hx kidney stone), elevated eye pressure at risk for glaucoma (clearance from ophthalmologist for P37, Wellbutrin, Topamax)    Comorbid conditions: HTN,HLD, GERD, IBS  Food: Struggles: sweets, carbs james breakfast  Exercise: Treadmill 20 min about 5-7 days week  Personal goals/motivations: To feel better, clothes fit better    Pancreatitis: No   Glaucoma: No, however eye pressures are elevated, at risk for glaucoma  Headaches: Yes, infrequent   HTN:  Yes  Heart palpitations: No  Thyroid C cell cancer: No  MEN syndrome personal or family hx: No  Nephrolithiasis: Yes, 2015, lithotripsy, isolated event    Macronutrient daily goals:  Protein 100 g/day  Calories 9564-8254/day  Net carbs- 50-75/day  Water 85 oz/ day      Assessment & Plan:  Patient's (Body mass index is 25.97 kg/m².) indicates that they are overweight with health conditions that include hypertension, dyslipidemias, and GERD . Weight is newly identified. BMI is is above average; BMI management plan is completed. We discussed portion control, increasing exercise, and an velia-based approach such as [employer] Pal or Lose It.     Topics of discussion included obesity as a disease, nutritional education on food groups, exercise, and medications. Patient was instructed in adequate protein, controlled carb and controlled fat intake.   Patient received instructions on using the medicines as a tool in controlling their weight with nutritional and behavioral changes. Risks and benefits were discussed. I believe the potential benefits of medication helping to decrease weight outweighs the risks.  Patient is to try nutritonal/behavioral changes only first.   Patient received our clinic education booklet.   Our patient consent form was reviewed including potential risks of weight loss. We also reviewed our confidentiality and HIPPA statements. Patients current FITT score was reviewed along with current capability for exercise tolerance and a patient will work towards a FITT score of:     Frequency   Intensity Time Strength Training   []   0 None  []   0 None  []   0 None  []   0 None    [x]   1 (1-2x/week) []   1 (light) []   1 (<10 min) []   1 (1x/week)   []   2 (3-5x/week) [x]   2 (moderate) []   2 (10-20 min) [x]   2 (2x/week)   []   3 (daily)   []   3 (moderately hard)  []   4 (very hard) [x]   3 (20-30 min)  []   4 (>30 min) []   3 (3-4x/week)       Patient's past medical history was reviewed in detail and barriers to weight loss were identified and discussed. Past efforts at weight reduction on their own as well as under physician supervision were documented and discussed.  I advised patient to continue routine care with their Primary Care Provider.     Nutritional recommendations and goals were reviewed including Calories: 7691-2022  daily adjusted for exercise calories burnt, Protein: g daily, Net carbs (total carb - fiber) of 50-75g per day.  Start to keep a food journal and bring into next visit in 2 weeks for review. Practice the behavioral modification technique of mindful eating. Take one MVI daily and 2000mg fish oil daily. Take other medications and supplements as directed.     - Goal to log all meals prior to next visit utilizing [employer] food journal   - Prioritize Protein, fiber and hydration (Protein goal 100 g daily, fiber goal 25 daily, water goal 85 ounces daily)  - Labs, UDS today. Will discuss results in detail at follow up visit  - Consider anti obesity medications if indicated. Will discuss in further detail at follow up visit  - Encouraged to continue her current exercise  routine  - Start OTC burp less fish oil 2,000 mg daily  - Start OTC multivitamin    Orders:  -     Insulin, Total  -     Comprehensive Metabolic Panel  -     Hemoglobin A1c  -     Lipid Panel  -     TSH  -     CBC (No Diff)  -     T4, Free  -     Vitamin D,25-Hydroxy  -     Vitamin B12  -     Omega-3 Fatty Acids (fish oil) 1000 MG capsule capsule; Take 1 capsule by mouth 2 (Two) Times a Day With Meals.  -     multivitamin with minerals (Multivitamin Adults) tablet tablet; Take 1 tablet by mouth Daily.    2. Essential hypertension  Assessment & Plan:  Hypertension is stable and controlled on Norvasc 5 mg daily  Continue current treatment regimen.  Blood pressure will be reassessed  at next scheduled follow up  .    Orders:  -     TSH    3. Hyperlipidemia, unspecified hyperlipidemia type  Assessment & Plan:  On Simvastatin 10 mg daily. No recent lipid for review. Will obtain Lipid today. Continue current plan.      Orders:  -     Lipid Panel    4. Gastroesophageal reflux disease without esophagitis  Assessment & Plan:  Chronic stable on Omeprazole. She sees GI, Dr Anton and has had recent normal EGD, colonoscopy. She feels this has improved over several months with use of PPI. Continue plan.       5. Fatigue, unspecified type  Assessment & Plan:  She has felt more tired recently stating she can take afternoon naps. She has poor sleep pattern, < 6 hrs nightly given she wakes several times to take care of her . Labs today. Consider B12 shot next visit.       6. Encounter for therapeutic drug level monitoring  -     Urine Drug Screen - Urine, Clean Catch          Follow Up   Return in about 2 weeks (around 5/8/2024).  Patient was given instructions and counseling regarding her condition or for health maintenance advice. Please see specific information pulled into the AVS if appropriate.     Clara Del Toro, APRN  04/24/2024

## 2024-04-24 NOTE — ASSESSMENT & PLAN NOTE
Patient's (Body mass index is 25.97 kg/m².) indicates that they are overweight with health conditions that include hypertension, dyslipidemias, and GERD . Weight is newly identified. BMI is is above average; BMI management plan is completed. We discussed portion control, increasing exercise, and an velia-based approach such as [employer]omi Pal or Lose It.     Topics of discussion included obesity as a disease, nutritional education on food groups, exercise, and medications. Patient was instructed in adequate protein, controlled carb and controlled fat intake.   Patient received instructions on using the medicines as a tool in controlling their weight with nutritional and behavioral changes. Risks and benefits were discussed. I believe the potential benefits of medication helping to decrease weight outweighs the risks. Patient is to try nutritonal/behavioral changes only first.   Patient received our clinic education booklet.   Our patient consent form was reviewed including potential risks of weight loss. We also reviewed our confidentiality and HIPPA statements. Patients current FITT score was reviewed along with current capability for exercise tolerance and a patient will work towards a FITT score of:     Frequency   Intensity Time Strength Training   []   0 None  []   0 None  []   0 None  []   0 None    [x]   1 (1-2x/week) []   1 (light) []   1 (<10 min) []   1 (1x/week)   []   2 (3-5x/week) [x]   2 (moderate) []   2 (10-20 min) [x]   2 (2x/week)   []   3 (daily)   []   3 (moderately hard)  []   4 (very hard) [x]   3 (20-30 min)  []   4 (>30 min) []   3 (3-4x/week)       Patient's past medical history was reviewed in detail and barriers to weight loss were identified and discussed. Past efforts at weight reduction on their own as well as under physician supervision were documented and discussed.  I advised patient to continue routine care with their Primary Care Provider.     Nutritional recommendations and goals were  reviewed including Calories: 6126-5681  daily adjusted for exercise calories burnt, Protein: g daily, Net carbs (total carb - fiber) of 50-75g per day.  Start to keep a food journal and bring into next visit in 2 weeks for review. Practice the behavioral modification technique of mindful eating. Take one MVI daily and 2000mg fish oil daily. Take other medications and supplements as directed.     - Goal to log all meals prior to next visit utilizing [employer] food journal   - Prioritize Protein, fiber and hydration (Protein goal 100 g daily, fiber goal 25 daily, water goal 85 ounces daily)  - Labs, UDS today. Will discuss results in detail at follow up visit  - Consider anti obesity medications if indicated. Will discuss in further detail at follow up visit  - Encouraged to continue her current exercise routine  - Start OTC burp less fish oil 2,000 mg daily  - Start OTC multivitamin

## 2024-04-24 NOTE — ASSESSMENT & PLAN NOTE
Hypertension is stable and controlled on Norvasc 5 mg daily  Continue current treatment regimen.  Blood pressure will be reassessed  at next scheduled follow up  .

## 2024-04-24 NOTE — ASSESSMENT & PLAN NOTE
Chronic stable on Omeprazole. She sees GI, Dr Anton and has had recent normal EGD, colonoscopy. She feels this has improved over several months with use of PPI. Continue plan.

## 2024-04-25 LAB
25(OH)D3+25(OH)D2 SERPL-MCNC: 11.7 NG/ML (ref 30–100)
ALBUMIN SERPL-MCNC: 4.6 G/DL (ref 3.8–4.9)
ALBUMIN/GLOB SERPL: 1.6 {RATIO} (ref 1.2–2.2)
ALP SERPL-CCNC: 106 IU/L (ref 44–121)
ALT SERPL-CCNC: 18 IU/L (ref 0–32)
AMPHETAMINES UR QL SCN: NEGATIVE NG/ML
AST SERPL-CCNC: 20 IU/L (ref 0–40)
BARBITURATES UR QL SCN: NEGATIVE NG/ML
BENZODIAZ UR QL SCN: NEGATIVE NG/ML
BILIRUB SERPL-MCNC: 0.5 MG/DL (ref 0–1.2)
BUN SERPL-MCNC: 10 MG/DL (ref 6–24)
BUN/CREAT SERPL: 14 (ref 9–23)
BZE UR QL SCN: NEGATIVE NG/ML
CALCIUM SERPL-MCNC: 10.2 MG/DL (ref 8.7–10.2)
CANNABINOIDS UR QL SCN: NEGATIVE NG/ML
CHLORIDE SERPL-SCNC: 102 MMOL/L (ref 96–106)
CHOLEST SERPL-MCNC: 176 MG/DL (ref 100–199)
CO2 SERPL-SCNC: 26 MMOL/L (ref 20–29)
CREAT SERPL-MCNC: 0.73 MG/DL (ref 0.57–1)
CREAT UR-MCNC: 78.5 MG/DL (ref 20–300)
EGFRCR SERPLBLD CKD-EPI 2021: 100 ML/MIN/1.73
ERYTHROCYTE [DISTWIDTH] IN BLOOD BY AUTOMATED COUNT: 12.5 % (ref 11.7–15.4)
GLOBULIN SER CALC-MCNC: 2.9 G/DL (ref 1.5–4.5)
GLUCOSE SERPL-MCNC: 88 MG/DL (ref 70–99)
HBA1C MFR BLD: 6.1 % (ref 4.8–5.6)
HCT VFR BLD AUTO: 44.3 % (ref 34–46.6)
HDLC SERPL-MCNC: 58 MG/DL
HGB BLD-MCNC: 14.4 G/DL (ref 11.1–15.9)
INSULIN SERPL-ACNC: 10.2 UIU/ML (ref 2.6–24.9)
LABORATORY COMMENT REPORT: NORMAL
LDLC SERPL CALC-MCNC: 100 MG/DL (ref 0–99)
MCH RBC QN AUTO: 28.6 PG (ref 26.6–33)
MCHC RBC AUTO-ENTMCNC: 32.5 G/DL (ref 31.5–35.7)
MCV RBC AUTO: 88 FL (ref 79–97)
METHADONE UR QL SCN: NEGATIVE NG/ML
OPIATES UR QL SCN: NEGATIVE NG/ML
OXYCODONE+OXYMORPHONE UR QL SCN: NEGATIVE NG/ML
PCP UR QL: NEGATIVE NG/ML
PH UR: 7.3 [PH] (ref 4.5–8.9)
PLATELET # BLD AUTO: 270 X10E3/UL (ref 150–450)
POTASSIUM SERPL-SCNC: 4.2 MMOL/L (ref 3.5–5.2)
PROPOXYPH UR QL SCN: NEGATIVE NG/ML
PROT SERPL-MCNC: 7.5 G/DL (ref 6–8.5)
RBC # BLD AUTO: 5.03 X10E6/UL (ref 3.77–5.28)
SODIUM SERPL-SCNC: 141 MMOL/L (ref 134–144)
T4 FREE SERPL-MCNC: 1.24 NG/DL (ref 0.82–1.77)
TRIGL SERPL-MCNC: 97 MG/DL (ref 0–149)
TSH SERPL DL<=0.005 MIU/L-ACNC: 3.43 UIU/ML (ref 0.45–4.5)
VIT B12 SERPL-MCNC: 367 PG/ML (ref 232–1245)
VLDLC SERPL CALC-MCNC: 18 MG/DL (ref 5–40)
WBC # BLD AUTO: 5.1 X10E3/UL (ref 3.4–10.8)

## 2024-05-07 PROBLEM — R73.03 PREDIABETES: Status: ACTIVE | Noted: 2024-05-07

## 2024-05-08 ENCOUNTER — OFFICE VISIT (OUTPATIENT)
Dept: BARIATRICS/WEIGHT MGMT | Facility: CLINIC | Age: 54
End: 2024-05-08
Payer: COMMERCIAL

## 2024-05-08 VITALS
BODY MASS INDEX: 25.52 KG/M2 | HEIGHT: 68 IN | HEART RATE: 75 BPM | SYSTOLIC BLOOD PRESSURE: 132 MMHG | RESPIRATION RATE: 16 BRPM | WEIGHT: 168.4 LBS | DIASTOLIC BLOOD PRESSURE: 78 MMHG

## 2024-05-08 DIAGNOSIS — I10 ESSENTIAL HYPERTENSION: ICD-10-CM

## 2024-05-08 DIAGNOSIS — R73.03 PREDIABETES: ICD-10-CM

## 2024-05-08 DIAGNOSIS — E66.3 OVERWEIGHT (BMI 25.0-29.9): Primary | ICD-10-CM

## 2024-05-08 DIAGNOSIS — R53.82 CHRONIC FATIGUE: ICD-10-CM

## 2024-05-08 DIAGNOSIS — E55.9 VITAMIN D DEFICIENCY: ICD-10-CM

## 2024-05-08 PROCEDURE — 99214 OFFICE O/P EST MOD 30 MIN: CPT

## 2024-05-08 RX ORDER — METFORMIN HYDROCHLORIDE 500 MG/1
TABLET, EXTENDED RELEASE ORAL
Qty: 53 TABLET | Refills: 0 | Status: SHIPPED | OUTPATIENT
Start: 2024-05-08 | End: 2024-06-07

## 2024-05-08 RX ORDER — ERGOCALCIFEROL 1.25 MG/1
CAPSULE ORAL
Qty: 24 CAPSULE | Refills: 0 | Status: SHIPPED | OUTPATIENT
Start: 2024-05-08

## 2024-05-14 ENCOUNTER — TELEPHONE (OUTPATIENT)
Dept: BARIATRICS/WEIGHT MGMT | Facility: CLINIC | Age: 54
End: 2024-05-14
Payer: COMMERCIAL

## 2024-05-14 NOTE — TELEPHONE ENCOUNTER
Per letter from Matt Palomares DO ophthalmology patient is ok to start Weight loss drugs including Topamax, phentermine, Wellbutrin and naltrexone. They will watch her eyes closely with this as she has history of increased IOP. See letter in chart.

## 2024-06-04 DIAGNOSIS — R73.03 PREDIABETES: ICD-10-CM

## 2024-06-04 RX ORDER — METFORMIN HYDROCHLORIDE 500 MG/1
TABLET, EXTENDED RELEASE ORAL
Qty: 53 TABLET | Refills: 0 | Status: CANCELLED | OUTPATIENT
Start: 2024-06-04 | End: 2024-07-03

## 2024-06-05 DIAGNOSIS — R73.03 PREDIABETES: Primary | ICD-10-CM

## 2024-06-05 RX ORDER — METFORMIN HYDROCHLORIDE 500 MG/1
500 TABLET, EXTENDED RELEASE ORAL 2 TIMES DAILY WITH MEALS
Qty: 60 TABLET | Refills: 2 | Status: SHIPPED | OUTPATIENT
Start: 2024-06-05

## 2024-06-06 NOTE — PROGRESS NOTES
[facility]  [address]  [place] [address]     Office Note      Date: 06/10/2024  Patient Name: Agnieszka Fontenot  MRN: [id number]  : 1970    Subjective     Chief Complaint  Obesity Management follow-up          Agnieszka Fontenot presents to [facility] for obesity management.   Patient is satisfied with weight loss progress. Appetite is moderately controlled.On Metformin 500 bid started last visit.  She does admit to some diarrhea maybe 1 episode daily (softer stool) associated with prescribed medications today. The patient is taking multivitamin and is taking fish oil.  The patient is using a food journal.  This is a 1 month follow up.  She does admit to decrease in food cravings specifically decrease in sweet tooth. She feels like she has made some progress in the abdomen on some days.     Diet recall:    Breakfast: 2 eggs and slice bread  Snack: muscle milk protein shake  Lunch: ground beef taco  Snack: none  Dinner: chicken and broccoli kelvin  Snack: none    Beverages: water , muscle milk protein    The patient is exercising with a FITT score of:    Frequency Intensity Time Strength Training   []   0, none []   0 []   0 [x]   0   []   1 (1-2x/week) [x]   1 (light) []   1 (<10 min) []   1 (1x/week)   [x]   2 (3-5x/week) []   2 (moderate) []   2 (10-20 min) []   2 (2x/week)   []   3 (daily) []   3 (moderately hard)  []   4 (very hard) [x]   3 (20-30 min)  []   4 (>30 min) []   3 (3-4x/week)       Review of Systems   Constitutional:  Negative for appetite change and fatigue.   Eyes:  Negative for visual disturbance.   Cardiovascular:  Negative for chest pain and palpitations.   Gastrointestinal:  Negative for constipation and indigestion.   Neurological:  Negative for light-headedness.         Current Outpatient Medications:     ALLERGY SERUM INJECTION, Allergy  injections - monthly, Disp: , Rfl:     amLODIPine (NORVASC) 5 MG  "tablet, , Disp: , Rfl:     latanoprost (XALATAN) 0.005 % ophthalmic solution, Administer 1 drop to both eyes Every Night., Disp: , Rfl:     levocetirizine (XYZAL) 5 MG tablet, , Disp: , Rfl:     metFORMIN ER (GLUCOPHAGE-XR) 500 MG 24 hr tablet, Take 1 tablet by mouth 2 (Two) Times a Day With Meals., Disp: 60 tablet, Rfl: 2    montelukast (SINGULAIR) 10 MG tablet, Singulair 10 mg tablet  Daily, Disp: , Rfl:     multivitamin with minerals (Multivitamin Adults) tablet tablet, Take 1 tablet by mouth Daily., Disp: , Rfl:     Omega-3 Fatty Acids (fish oil) 1000 MG capsule capsule, Take 1 capsule by mouth 2 (Two) Times a Day With Meals., Disp: , Rfl:     omeprazole (priLOSEC) 40 MG capsule, Take 1 capsule by mouth 2 (Two) Times a Day Before Meals. Take a half hour before breakfast, Disp: 60 capsule, Rfl: 11    simvastatin (ZOCOR) 10 MG tablet, , Disp: , Rfl:     timolol (TIMOPTIC) 0.5 % ophthalmic solution, Administer 1 drop to both eyes Daily., Disp: , Rfl:     vitamin D (ERGOCALCIFEROL) 1.25 MG (59584 UT) capsule capsule, Take one capsule by mouth twice weekly. Take one capsule on Monday and one capsule on Thursday., Disp: 24 capsule, Rfl: 0    XIIDRA 5 % solution, , Disp: , Rfl:     Objective   Start weight: 170.8 pounds.    Total Loss lb/%Loss of beginning body weight (BBW): -5lb/-2.92%  Change in weight since last visit: -2.8    Recent Weight History:   Wt Readings from Last 6 Encounters:   06/10/24 75.2 kg (165 lb 12.8 oz)   05/08/24 76.4 kg (168 lb 6.4 oz)   04/24/24 77.5 kg (170 lb 12.8 oz)   02/07/24 79 kg (174 lb 3.2 oz)   01/02/24 79.5 kg (175 lb 3.2 oz)   02/06/23 76.4 kg (168 lb 7.5 oz)       Body mass index is 25.21 kg/m².   Body composition analysis completed and showed:   Body Fat %: 40.7    Measurements (in inches)  Waist Circumference: 43      Vital Signs:   /80 (BP Location: Left arm, Patient Position: Sitting)   Pulse 76   Ht 172.7 cm (68\")   Wt 75.2 kg (165 lb 12.8 oz)   BMI 25.21 kg/m²   "   Physical Exam   General appears stated age and normal appearance   HEENT PERRLA, EOM intact, and conjunctivae normal   Chest/lungs Normal rate, Regular rhythm, Breathing is unlabored, and Clear to auscultation bilaterally   Extremities without edema   Neuro Good historian and No focal deficit   Skin Warm, dry, intact   Psych normal behavior, normal thought content, and normal concentration       Common labs          4/24/2024    10:37   Common Labs   Glucose 88    BUN 10    Creatinine 0.73    Sodium 141    Potassium 4.2    Chloride 102    Calcium 10.2    Total Protein 7.5    Albumin 4.6    Total Bilirubin 0.5    Alkaline Phosphatase 106    AST (SGOT) 20    ALT (SGPT) 18    WBC 5.1    Hemoglobin 14.4    Hematocrit 44.3    Platelets 270    Total Cholesterol 176    Triglycerides 97    HDL Cholesterol 58    LDL Cholesterol  100    Hemoglobin A1C 6.1      TSH          4/24/2024    10:37   TSH   TSH 3.430               Assessment / Plan        Diagnoses and all orders for this visit:    1. Overweight (BMI 25.0-29.9) (Primary)  Overview:  Start weight: (170): 1st goal (10% weight loss): 153   Target Goal: 154    Current medication prescribed by MWM: Metformin 500bid  AOM excluded from plan    Rx Options: All (OK per ophthalmology)  Rx Caution: Topamax (Hx kidney stone), elevated eye pressure at risk for glaucoma (clearance OK from Dr. Matt Palomares ophthalmologist for P37, Wellbutrin, Topamax)    Comorbid conditions: HTN,HLD, GERD, IBS, Pre DM  Food: Struggles: sweets, carbs james breakfast  Exercise: Treadmill 20 min about 5-7 days week  Personal goals/motivations: To feel better, clothes fit better    Pancreatitis: No   Glaucoma: No, however eye pressures are elevated, at risk for glaucoma  Headaches: Yes, infrequent   HTN:  Yes  Heart palpitations: No  Thyroid C cell cancer: No  MEN syndrome personal or family hx: No  Nephrolithiasis: Yes, 2015, lithotripsy, isolated event    Macronutrient daily goals:  Protein 100  g/day  Calories 2959-8530/day  Net carbs- 50-75/day  Water 85 oz/ day      Assessment & Plan:  Patient's (Body mass index is 25.21 kg/m².) indicates that they are overweight with health conditions that include hypertension, dyslipidemias, and GERD . Weight is improving with treatment. BMI is is above average; BMI management plan is completed. We discussed portion control, increasing exercise, pharmacologic options including Metformin , and an velia-based approach such as [employer] Pal or Lose It.     I have instructed the patient to continue with pursuit of medical weight loss as a part of this program. Patient does meet criteria for use of anorectics at this time as body fat percentage >30%, BMI >25 with , hyperlipidemia, hypertension, and impaired fasting glucose.     The current plan for this month includes:   - This is a 1 month follow up visit and patient is down about 3 lbs since last visit on Metformin 500 mg bid  - Adjust macronutrients as discussed, continue increasing protein and fiber. Add fiber supplement and make sure to drink plenty of water. Bring food journal to next visit for review  - Continue Metformin 500 mg bid for prediabetes, insulin resistance. She does not want adjunct therapy today and would like to continue lifestyle modifications.  - Treatment goal 154 lb      2. Essential hypertension  Assessment & Plan:  Hypertension is stable and controlled On Norvasc 5 mg   Continue current treatment regimen.  Weight loss.  Regular aerobic exercise.  Blood pressure will be reassessed  at next scheduled visit .      3. Prediabetes  Overview:  A1C 6.1 4/2024 total insulin 10.2    Assessment & Plan:  On Metformin 500 bid, started last visit. Continue current plan.       4. Hyperlipidemia, unspecified hyperlipidemia type    5. Vitamin D deficiency  Overview:  Vit D 11.7 4/2024, on supplement start 5/2024, recheck Sept 2024    Assessment & Plan:  On supplement, recheck level Sept 2024      6. Chronic  fatigue          Follow Up   Return in about 1 month (around 7/10/2024).  Patient was given instructions and counseling regarding her condition or for health maintenance advice. Please see specific information pulled into the AVS if appropriate.     Clara Del Toro, APRN  06/10/2024

## 2024-06-10 ENCOUNTER — OFFICE VISIT (OUTPATIENT)
Dept: BARIATRICS/WEIGHT MGMT | Facility: CLINIC | Age: 54
End: 2024-06-10
Payer: COMMERCIAL

## 2024-06-10 VITALS
SYSTOLIC BLOOD PRESSURE: 122 MMHG | BODY MASS INDEX: 25.13 KG/M2 | HEIGHT: 68 IN | HEART RATE: 76 BPM | WEIGHT: 165.8 LBS | DIASTOLIC BLOOD PRESSURE: 80 MMHG

## 2024-06-10 DIAGNOSIS — I10 ESSENTIAL HYPERTENSION: ICD-10-CM

## 2024-06-10 DIAGNOSIS — R73.03 PREDIABETES: ICD-10-CM

## 2024-06-10 DIAGNOSIS — E78.5 HYPERLIPIDEMIA, UNSPECIFIED HYPERLIPIDEMIA TYPE: ICD-10-CM

## 2024-06-10 DIAGNOSIS — R53.82 CHRONIC FATIGUE: ICD-10-CM

## 2024-06-10 DIAGNOSIS — E55.9 VITAMIN D DEFICIENCY: ICD-10-CM

## 2024-06-10 DIAGNOSIS — E66.3 OVERWEIGHT (BMI 25.0-29.9): Primary | ICD-10-CM

## 2024-06-10 PROCEDURE — 99214 OFFICE O/P EST MOD 30 MIN: CPT

## 2024-06-10 NOTE — ASSESSMENT & PLAN NOTE
Hypertension is stable and controlled On Norvasc 5 mg   Continue current treatment regimen.  Weight loss.  Regular aerobic exercise.  Blood pressure will be reassessed  at next scheduled visit .

## 2024-06-10 NOTE — ASSESSMENT & PLAN NOTE
Patient's (Body mass index is 25.21 kg/m².) indicates that they are overweight with health conditions that include hypertension, dyslipidemias, and GERD . Weight is improving with treatment. BMI is is above average; BMI management plan is completed. We discussed portion control, increasing exercise, pharmacologic options including Metformin , and an velia-based approach such as [employer] Pal or Lose It.     I have instructed the patient to continue with pursuit of medical weight loss as a part of this program. Patient does meet criteria for use of anorectics at this time as body fat percentage >30%, BMI >25 with , hyperlipidemia, hypertension, and impaired fasting glucose.     The current plan for this month includes:   - This is a 1 month follow up visit and patient is down about 3 lbs since last visit on Metformin 500 mg bid  - Adjust macronutrients as discussed, continue increasing protein and fiber. Add fiber supplement and make sure to drink plenty of water. Bring food journal to next visit for review  - Continue Metformin 500 mg bid for prediabetes, insulin resistance. She does not want adjunct therapy today and would like to continue lifestyle modifications.  - Treatment goal 154 lb

## 2024-07-02 NOTE — PROGRESS NOTES
[facility]  [address]  [place] [address]     Office Note      Date: 2024  Patient Name: Agnieszka Fontenot  MRN: [id number]  : 1970    Subjective     Chief Complaint  Obesity Management follow-up          Agnieszka Fontenot presents to [facility] for obesity management.   Patient is satisfied with weight loss progress. Appetite is moderately controlled. On Metformin.  Reports some occasional diarrhea. The patient is taking multivitamin and is taking fish oil.  The patient is using a food journal.  She has been doing well, happy with weight loss.     Diet recall:    Breakfast: rice crispies and protein shake  Snack: none  Lunch: grilled chicken sandwich, none  Snack: none  Dinner: mozzarella sticks and chicken nuggets  Snack: none    Beverages: protein shake,     The patient is exercising with a FITT score of:    Frequency Intensity Time Strength Training   []   0, none []   0 []   0 [x]   0   []   1 (1-2x/week) [x]   1 (light) []   1 (<10 min) []   1 (1x/week)   [x]   2 (3-5x/week) []   2 (moderate) []   2 (10-20 min) []   2 (2x/week)   []   3 (daily) []   3 (moderately hard)  []   4 (very hard) [x]   3 (20-30 min)  []   4 (>30 min) []   3 (3-4x/week)       Review of Systems   Constitutional:  Negative for appetite change and fatigue.   Eyes:  Negative for visual disturbance.   Cardiovascular:  Negative for chest pain and palpitations.   Gastrointestinal:  Positive for diarrhea. Negative for constipation and indigestion.   Neurological:  Negative for light-headedness.         Current Outpatient Medications:     ALLERGY SERUM INJECTION, Allergy  injections - monthly, Disp: , Rfl:     amLODIPine (NORVASC) 5 MG tablet, , Disp: , Rfl:     latanoprost (XALATAN) 0.005 % ophthalmic solution, Administer 1 drop to both eyes Every Night., Disp: , Rfl:     levocetirizine (XYZAL) 5 MG tablet, , Disp: , Rfl:     metFORMIN ER (GLUCOPHAGE-XR)  "500 MG 24 hr tablet, Take 1 tablet by mouth 2 (Two) Times a Day With Meals., Disp: 60 tablet, Rfl: 2    montelukast (SINGULAIR) 10 MG tablet, Singulair 10 mg tablet  Daily, Disp: , Rfl:     multivitamin with minerals (Multivitamin Adults) tablet tablet, Take 1 tablet by mouth Daily., Disp: , Rfl:     Omega-3 Fatty Acids (fish oil) 1000 MG capsule capsule, Take 1 capsule by mouth 2 (Two) Times a Day With Meals., Disp: , Rfl:     omeprazole (priLOSEC) 40 MG capsule, Take 1 capsule by mouth 2 (Two) Times a Day Before Meals. Take a half hour before breakfast (Patient taking differently: Take 1 capsule by mouth Daily. Take a half hour before breakfast), Disp: 60 capsule, Rfl: 11    simvastatin (ZOCOR) 10 MG tablet, , Disp: , Rfl:     timolol (TIMOPTIC) 0.5 % ophthalmic solution, Administer 1 drop to both eyes Daily., Disp: , Rfl:     vitamin D (ERGOCALCIFEROL) 1.25 MG (09533 UT) capsule capsule, Take one capsule by mouth twice weekly. Take one capsule on Monday and one capsule on Thursday., Disp: 24 capsule, Rfl: 0    XIIDRA 5 % solution, , Disp: , Rfl:     Objective   Start weight: 170 pounds.    Total Loss lb/%Loss of beginning body weight (BBW): -8lb/-4.70%  Change in weight since last visit: -4    Recent Weight History:   Wt Readings from Last 6 Encounters:   07/18/24 73.5 kg (162 lb)   06/10/24 75.2 kg (165 lb 12.8 oz)   05/08/24 76.4 kg (168 lb 6.4 oz)   04/24/24 77.5 kg (170 lb 12.8 oz)   02/07/24 79 kg (174 lb 3.2 oz)   01/02/24 79.5 kg (175 lb 3.2 oz)       Body mass index is 24.63 kg/m².   Body composition analysis completed and showed:   Body Fat %: 40.9    Measurements (in inches)  Waist Circumference: 42.5      Vital Signs:   /82 (BP Location: Right arm, Patient Position: Sitting)   Pulse 76   Ht 172.7 cm (68\")   Wt 73.5 kg (162 lb)   BMI 24.63 kg/m²     Physical Exam   General appears stated age and normal appearance   HEENT conjunctivae normal   Chest/lungs Normal rate, Regular rhythm, Breathing " is unlabored, and Clear to auscultation bilaterally   Extremities without edema   Neuro Good historian and No focal deficit   Skin Warm, dry, intact   Psych normal behavior, normal thought content, and normal concentration       Common labs          4/24/2024    10:37   Common Labs   Glucose 88    BUN 10    Creatinine 0.73    Sodium 141    Potassium 4.2    Chloride 102    Calcium 10.2    Total Protein 7.5    Albumin 4.6    Total Bilirubin 0.5    Alkaline Phosphatase 106    AST (SGOT) 20    ALT (SGPT) 18    WBC 5.1    Hemoglobin 14.4    Hematocrit 44.3    Platelets 270    Total Cholesterol 176    Triglycerides 97    HDL Cholesterol 58    LDL Cholesterol  100    Hemoglobin A1C 6.1      TSH          4/24/2024    10:37   TSH   TSH 3.430               Assessment / Plan        Diagnoses and all orders for this visit:    1. Encounter for weight loss counseling (Primary)  Overview:  Start weight: (170): 1st goal (10% weight loss): 153   Target Goal: 154    Current medication prescribed by MWM: Metformin 500bid  AOM excluded from plan    Rx Options: All (OK per ophthalmology); She is set on lifestyle modifications  Rx Caution: Topamax (Hx kidney stone), elevated eye pressure at risk for glaucoma (clearance OK from Dr. Matt Palomares ophthalmologist for P37, Wellbutrin, Topamax)    Comorbid conditions: HTN,HLD, GERD, IBS, Pre DM  Food: Struggles: sweets, carbs james breakfast  Exercise: Treadmill 20 min about 5-7 days week  Personal goals/motivations: To feel better, clothes fit better    Pancreatitis: No   Glaucoma: No, however eye pressures are elevated, at risk for glaucoma  Headaches: Yes, infrequent   HTN:  Yes  Heart palpitations: No  Thyroid C cell cancer: No  MEN syndrome personal or family hx: No  Nephrolithiasis: Yes, 2015, lithotripsy, isolated event    Macronutrient daily goals:  Protein 100 g/day  Calories 6628-6923/day  Net carbs- 50-75/day  Water 85 oz/ day    Assessment & Plan:  I have instructed the patient to  continue with pursuit of medical weight loss as a part of this program. Patient does meet criteria for use of anorectics at this time as body fat percentage >30%, hypercholesterolemia, hypertension, impaired fasting glucose, and is not at treatment goal.     The current plan for this month includes:   - 1 month f/u visit, she is down another 4 lbs with total of 8 lbs weight loss and has entered into a normal BMI range of 24.6 with a history of being overweight, fat % still high at 40 on Metformin  - Add resistance training as discussed especially pending DEXA scan results  - Continue to work on lifestyle behavioral changes  - Continue to prioritize protein, fiber, and hydration.   - Will check Vit D level and A1C Sept 2024  - Continue Metformin 500 mg bid for prediabetes  - Treatment goal 154 lb, goal to reach < 34% fat                2. Essential hypertension  Assessment & Plan:  Hypertension is stable and controlled  Continue current treatment regimen.  Weight loss.  Regular aerobic exercise.  Blood pressure will be reassessed  at next scheduled visit .      3. Vitamin D deficiency  Overview:  Vit D 11.7 4/2024, on supplement start 5/2024, recheck Sept 2024    Assessment & Plan:  Continue supplement until finished. Recheck level Sept 2024      4. Prediabetes  Overview:  A1C 6.1 4/2024 total insulin 10.2    Assessment & Plan:  On Metformin, will recheck A1C in Sept 2024. Continue current plan.             Follow Up   Return in about 1 month (around 8/18/2024).  Patient was given instructions and counseling regarding her condition or for health maintenance advice. Please see specific information pulled into the AVS if appropriate.     Clara Del Toro, APRN  07/18/2024

## 2024-07-18 ENCOUNTER — OFFICE VISIT (OUTPATIENT)
Dept: BARIATRICS/WEIGHT MGMT | Facility: CLINIC | Age: 54
End: 2024-07-18
Payer: COMMERCIAL

## 2024-07-18 VITALS
WEIGHT: 162 LBS | SYSTOLIC BLOOD PRESSURE: 130 MMHG | HEART RATE: 76 BPM | HEIGHT: 68 IN | BODY MASS INDEX: 24.55 KG/M2 | DIASTOLIC BLOOD PRESSURE: 82 MMHG

## 2024-07-18 DIAGNOSIS — R73.03 PREDIABETES: ICD-10-CM

## 2024-07-18 DIAGNOSIS — I10 ESSENTIAL HYPERTENSION: ICD-10-CM

## 2024-07-18 DIAGNOSIS — E55.9 VITAMIN D DEFICIENCY: ICD-10-CM

## 2024-07-18 DIAGNOSIS — Z71.3 ENCOUNTER FOR WEIGHT LOSS COUNSELING: Primary | ICD-10-CM

## 2024-07-18 NOTE — ASSESSMENT & PLAN NOTE
I have instructed the patient to continue with pursuit of medical weight loss as a part of this program. Patient does meet criteria for use of anorectics at this time as body fat percentage >30%, hypercholesterolemia, hypertension, impaired fasting glucose, and is not at treatment goal.     The current plan for this month includes:   - 1 month f/u visit, she is down another 4 lbs with total of 8 lbs weight loss and has entered into a normal BMI range of 24.6 with a history of being overweight, fat % still high at 40 on Metformin  - Add resistance training as discussed especially pending DEXA scan results  - Continue to work on lifestyle behavioral changes  - Continue to prioritize protein, fiber, and hydration.   - Will check Vit D level and A1C Sept 2024  - Continue Metformin 500 mg bid for prediabetes  - Treatment goal 154 lb, goal to reach < 34% fat

## 2024-08-18 NOTE — PROGRESS NOTES
[facility]  [address]  [place] [address]     Office Note      Date: 2024  Patient Name: Agnieszka Fontenot  MRN: [id number]  : 1970    Subjective     Chief Complaint  Obesity Management follow-up          Agnieszka Fontenot presents to [facility] for obesity management.   Patient is satisfied with weight loss progress. Appetite is moderately controlled. On Metformin. Reports no side effects of prescribed medications today. The patient is taking multivitamin and is taking fish oil.  The patient is using a food journal.      Diet recall:    Breakfast: honey nut cheerios  Snack: protein shake  muscle milk 32 g   Lunch: tomato soup, cheese and crackers, strawberries and applesauce no sugar  Snack: none  Dinner: flores and tomato sandwich with keto bread 6 g protein  Snack: none    Beverages: water about 40 oz     The patient is exercising, treadmill 20-30 minutes 5 days weekly with a FITT score of:    Frequency Intensity Time Strength Training   []   0, none []   0 []   0 [x]   0   []   1 (1-2x/week) [x]   1 (light) []   1 (<10 min) []   1 (1x/week)   [x]   2 (3-5x/week) []   2 (moderate) []   2 (10-20 min) []   2 (2x/week)   []   3 (daily) []   3 (moderately hard)  []   4 (very hard) [x]   3 (20-30 min)  []   4 (>30 min) []   3 (3-4x/week)       Review of Systems   Constitutional:  Negative for appetite change and fatigue.   Eyes:  Negative for visual disturbance.   Cardiovascular:  Negative for chest pain and palpitations.   Gastrointestinal:  Negative for constipation and indigestion.   Neurological:  Negative for light-headedness.         Current Outpatient Medications:     ALLERGY SERUM INJECTION, Allergy  injections - monthly, Disp: , Rfl:     amLODIPine (NORVASC) 5 MG tablet, , Disp: , Rfl:     latanoprost (XALATAN) 0.005 % ophthalmic solution, Administer 1 drop to both eyes Every Night., Disp: , Rfl:     levocetirizine  "(XYZAL) 5 MG tablet, , Disp: , Rfl:     metFORMIN ER (GLUCOPHAGE-XR) 500 MG 24 hr tablet, Take 1 tablet by mouth 2 (Two) Times a Day With Meals., Disp: 60 tablet, Rfl: 2    montelukast (SINGULAIR) 10 MG tablet, Singulair 10 mg tablet  Daily, Disp: , Rfl:     multivitamin with minerals (Multivitamin Adults) tablet tablet, Take 1 tablet by mouth Daily., Disp: , Rfl:     Omega-3 Fatty Acids (fish oil) 1000 MG capsule capsule, Take 1 capsule by mouth 2 (Two) Times a Day With Meals., Disp: , Rfl:     omeprazole (priLOSEC) 40 MG capsule, Take 1 capsule by mouth 2 (Two) Times a Day Before Meals. Take a half hour before breakfast (Patient taking differently: Take 1 capsule by mouth Daily. Take a half hour before breakfast), Disp: 60 capsule, Rfl: 11    simvastatin (ZOCOR) 10 MG tablet, , Disp: , Rfl:     timolol (TIMOPTIC) 0.5 % ophthalmic solution, Administer 1 drop to both eyes Daily., Disp: , Rfl:     XIIDRA 5 % solution, , Disp: , Rfl:     Objective   Start weight: 170 pounds.    Total Loss lb/%Loss of beginning body weight (BBW): -9.4lb/-5.53%  Change in weight since last visit: -1.4    Recent Weight History:   Wt Readings from Last 6 Encounters:   08/20/24 72.8 kg (160 lb 9.6 oz)   07/18/24 73.5 kg (162 lb)   06/10/24 75.2 kg (165 lb 12.8 oz)   05/08/24 76.4 kg (168 lb 6.4 oz)   04/24/24 77.5 kg (170 lb 12.8 oz)   02/07/24 79 kg (174 lb 3.2 oz)       Body mass index is 24.42 kg/m².   Body composition analysis completed and showed:   Body Fat %: 39.6%    Measurements (in inches)  Waist Circumference: 40      Vital Signs:   /78   Pulse 71   Resp 18   Ht 172.7 cm (68\")   Wt 72.8 kg (160 lb 9.6 oz)   SpO2 99%   BMI 24.42 kg/m²     Physical Exam   General appears stated age and normal appearance   HEENT conjunctivae normal   Chest/lungs Normal rate, Regular rhythm, and Breathing is unlabored   Extremities without edema   Neuro Good historian and No focal deficit   Skin Warm, dry, intact   Psych normal behavior, " normal thought content, and normal concentration       Common labs          4/24/2024    10:37   Common Labs   Glucose 88    BUN 10    Creatinine 0.73    Sodium 141    Potassium 4.2    Chloride 102    Calcium 10.2    Total Protein 7.5    Albumin 4.6    Total Bilirubin 0.5    Alkaline Phosphatase 106    AST (SGOT) 20    ALT (SGPT) 18    WBC 5.1    Hemoglobin 14.4    Hematocrit 44.3    Platelets 270    Total Cholesterol 176    Triglycerides 97    HDL Cholesterol 58    LDL Cholesterol  100    Hemoglobin A1C 6.1      TSH          4/24/2024    10:37   TSH   TSH 3.430               Assessment / Plan        Diagnoses and all orders for this visit:    1. Encounter for weight loss counseling (Primary)  Overview:  Start weight: (170): 1st goal (10% weight loss): 153   Target Goal: 154    Current medication prescribed by MWM: Metformin 500bid  AOM excluded from plan    Rx Options: All (OK per ophthalmology); She is set on lifestyle modifications  Rx Caution: Topamax (Hx kidney stone), elevated eye pressure at risk for glaucoma (clearance OK from Dr. Matt Palomares ophthalmologist for P37, Wellbutrin, Topamax)    Comorbid conditions: HTN,HLD, GERD, IBS, Pre DM  Food: Struggles: sweets, carbs james breakfast  Exercise: Treadmill 20 min about 5-7 days week  Personal goals/motivations: To feel better, clothes fit better    Pancreatitis: No   Glaucoma: No, however eye pressures are elevated, at risk for glaucoma  Headaches: Yes, infrequent   HTN:  Yes  Heart palpitations: No  Thyroid C cell cancer: No  MEN syndrome personal or family hx: No  Nephrolithiasis: Yes, 2015, lithotripsy, isolated event    Macronutrient daily goals:  Protein 100 g/day  Calories 9708-6825/day  Net carbs- 50-75/day  Water 85 oz/ day    Assessment & Plan:  Patient's (Body mass index is 24.42 kg/m².) indicates that they are overweight with health conditions that include hypertension, dyslipidemias, and GERD . Weight is improving with treatment. BMI is above  average; BMI management plan is completed. We discussed portion control, increasing exercise, pharmacologic options including metformin, and an velia-based approach such as [employer] Pal or Lose It.     I have instructed the patient to continue with pursuit of medical weight loss as a part of this program. Patient does meet criteria for use of anorectics at this time as BMI >30 , body fat percentage >30%, hyperlipidemia, hypertension, and is not at treatment goal.     The current plan for this month includes:   - 1 month f/u visit and she is down about 2 lbs on Metformin  - Add resistance training and continue exercise efforts  - Continue to work on lifestyle behavioral changes  - Continue to prioritize protein, fiber, and hydration. Work towards increasing protein and fiber ad discussed  - Try to avoid simple processed carbohydrates at dinner time and try to avoid eating within 3 hrs prior to bedtime  - Continue Metformin 500 mg twice daily for prediabetes  - Treatment goal 154 lb      2. Essential hypertension  Assessment & Plan:  Hypertension is stable and controlled  Continue current treatment regimen.  Weight loss.  Regular aerobic exercise.  Blood pressure will be reassessed  at next scheduled visit .      3. Vitamin D deficiency  Overview:  Vit D 11.7 4/2024, on supplement start 5/2024, recheck Sept 2024    Assessment & Plan:  Continue plan. Recheck Sept 2024      4. Prediabetes  Overview:  A1C 6.1 4/2024 total insulin 10.2    Assessment & Plan:  Low carb diet, regular physical activity, and weight reduction of 10-15%.   Continue Metformin    Orders:  -     metFORMIN ER (GLUCOPHAGE-XR) 500 MG 24 hr tablet; Take 1 tablet by mouth 2 (Two) Times a Day With Meals.  Dispense: 60 tablet; Refill: 2          Follow Up   Return in about 1 month (around 9/20/2024).  Patient was given instructions and counseling regarding her condition or for health maintenance advice. Please see specific information pulled into the AVS  if appropriate.     Clara Del Toro, APRN  08/20/2024

## 2024-08-20 ENCOUNTER — OFFICE VISIT (OUTPATIENT)
Dept: BARIATRICS/WEIGHT MGMT | Facility: CLINIC | Age: 54
End: 2024-08-20
Payer: COMMERCIAL

## 2024-08-20 VITALS
HEIGHT: 68 IN | HEART RATE: 71 BPM | OXYGEN SATURATION: 99 % | BODY MASS INDEX: 24.34 KG/M2 | WEIGHT: 160.6 LBS | DIASTOLIC BLOOD PRESSURE: 78 MMHG | RESPIRATION RATE: 18 BRPM | SYSTOLIC BLOOD PRESSURE: 126 MMHG

## 2024-08-20 DIAGNOSIS — I10 ESSENTIAL HYPERTENSION: ICD-10-CM

## 2024-08-20 DIAGNOSIS — R73.03 PREDIABETES: ICD-10-CM

## 2024-08-20 DIAGNOSIS — E55.9 VITAMIN D DEFICIENCY: ICD-10-CM

## 2024-08-20 DIAGNOSIS — Z71.3 ENCOUNTER FOR WEIGHT LOSS COUNSELING: Primary | ICD-10-CM

## 2024-08-20 PROBLEM — R73.01 IMPAIRED FASTING GLUCOSE: Status: ACTIVE | Noted: 2024-06-25

## 2024-08-20 PROCEDURE — 99214 OFFICE O/P EST MOD 30 MIN: CPT

## 2024-08-20 RX ORDER — METFORMIN HYDROCHLORIDE 500 MG/1
500 TABLET, EXTENDED RELEASE ORAL 2 TIMES DAILY WITH MEALS
Qty: 60 TABLET | Refills: 2 | Status: SHIPPED | OUTPATIENT
Start: 2024-08-20

## 2024-08-20 NOTE — ASSESSMENT & PLAN NOTE
Patient's (Body mass index is 24.42 kg/m².) indicates that they are overweight with health conditions that include hypertension, dyslipidemias, and GERD . Weight is improving with treatment. BMI is above average; BMI management plan is completed. We discussed portion control, increasing exercise, pharmacologic options including metformin, and an velia-based approach such as [employer] Pal or Lose It.     I have instructed the patient to continue with pursuit of medical weight loss as a part of this program. Patient does meet criteria for use of anorectics at this time as BMI >30 , body fat percentage >30%, hyperlipidemia, hypertension, and is not at treatment goal.     The current plan for this month includes:   - 1 month f/u visit and she is down about 2 lbs on Metformin  - Add resistance training and continue exercise efforts  - Continue to work on lifestyle behavioral changes  - Continue to prioritize protein, fiber, and hydration. Work towards increasing protein and fiber ad discussed  - Try to avoid simple processed carbohydrates at dinner time and try to avoid eating within 3 hrs prior to bedtime  - Continue Metformin 500 mg twice daily for prediabetes  - Treatment goal 154 lb

## 2024-08-20 NOTE — ASSESSMENT & PLAN NOTE
Patient's (Body mass index is 24.42 kg/m².) indicates that they are overweight with health conditions that include hypertension, dyslipidemias, and GERD . Weight is improving with treatment. BMI is above average; BMI management plan is completed. We discussed portion control, increasing exercise, pharmacologic options including metformin, and an velia-based approach such as [employer] Pal or Lose It.     I have instructed the patient to continue with pursuit of medical weight loss as a part of this program. Patient does meet criteria for use of anorectics at this time as BMI >30 , body fat percentage >30%, hyperlipidemia, hypertension, and is not at treatment goal.     The current plan for this month includes:   - 1 month f/u visit and she is down about 2 lbs on Metformin  - Add resistance training  - Continue to work on lifestyle behavioral changes  - Continue to prioritize protein, fiber, and hydration. Work towards increasing protein and fiber  - Treatment goal 154 lb

## 2024-09-18 ENCOUNTER — OFFICE VISIT (OUTPATIENT)
Dept: BARIATRICS/WEIGHT MGMT | Facility: CLINIC | Age: 54
End: 2024-09-18
Payer: COMMERCIAL

## 2024-09-18 VITALS
WEIGHT: 158.8 LBS | RESPIRATION RATE: 18 BRPM | BODY MASS INDEX: 24.07 KG/M2 | SYSTOLIC BLOOD PRESSURE: 136 MMHG | HEIGHT: 68 IN | DIASTOLIC BLOOD PRESSURE: 78 MMHG | HEART RATE: 74 BPM | OXYGEN SATURATION: 98 %

## 2024-09-18 DIAGNOSIS — I10 ESSENTIAL HYPERTENSION: ICD-10-CM

## 2024-09-18 DIAGNOSIS — R73.03 PREDIABETES: ICD-10-CM

## 2024-09-18 DIAGNOSIS — E55.9 VITAMIN D DEFICIENCY: ICD-10-CM

## 2024-09-18 DIAGNOSIS — Z71.3 ENCOUNTER FOR WEIGHT LOSS COUNSELING: Primary | ICD-10-CM

## 2024-09-19 LAB
25(OH)D3+25(OH)D2 SERPL-MCNC: 48.8 NG/ML (ref 30–100)
HBA1C MFR BLD: 5.7 % (ref 4.8–5.6)

## 2024-10-11 NOTE — PROGRESS NOTES
[facility]  [address]  [place] [address]     Office Note      Date: 10/18/2024  Patient Name: Agnieszka Fontenot  MRN: [id number]  : 1970    Subjective     Chief Complaint  Obesity Management follow-up          Agnieszka Fontenot presents to [facility] for obesity management.   Patient is satisfied with weight loss progress. Appetite is moderately controlled. On Metformin. Reports some occasional diarrhea. The patient is taking multivitamin and is not taking fish oil.  The patient is using a food journal.   recently had mini stroke.     The patient is exercising with a FITT score of:    Frequency Intensity Time Strength Training   []   0, none []   0 []   0 [x]   0   []   1 (1-2x/week) [x]   1 (light) []   1 (<10 min) []   1 (1x/week)   [x]   2 (3-5x/week) []   2 (moderate) []   2 (10-20 min) []   2 (2x/week)   []   3 (daily) []   3 (moderately hard)  []   4 (very hard) [x]   3 (20-30 min)  []   4 (>30 min) []   3 (3-4x/week)       Review of Systems   Constitutional:  Negative for appetite change and fatigue.   Eyes:  Negative for visual disturbance.   Cardiovascular:  Negative for chest pain and palpitations.   Gastrointestinal:  Negative for constipation and indigestion.   Neurological:  Negative for light-headedness.         Current Outpatient Medications:     ALLERGY SERUM INJECTION, Allergy  injections - monthly, Disp: , Rfl:     amLODIPine (NORVASC) 5 MG tablet, , Disp: , Rfl:     latanoprost (XALATAN) 0.005 % ophthalmic solution, Administer 1 drop to both eyes Every Night., Disp: , Rfl:     levocetirizine (XYZAL) 5 MG tablet, , Disp: , Rfl:     metFORMIN ER (GLUCOPHAGE-XR) 500 MG 24 hr tablet, Take 1 tablet by mouth 2 (Two) Times a Day With Meals., Disp: 60 tablet, Rfl: 2    montelukast (SINGULAIR) 10 MG tablet, Singulair 10 mg tablet  Daily, Disp: , Rfl:     multivitamin with minerals (Multivitamin Adults) tablet  "tablet, Take 1 tablet by mouth Daily., Disp: , Rfl:     omeprazole (priLOSEC) 40 MG capsule, Take 1 capsule by mouth 2 (Two) Times a Day Before Meals. Take a half hour before breakfast (Patient taking differently: Take 1 capsule by mouth Daily. Take a half hour before breakfast), Disp: 60 capsule, Rfl: 11    simvastatin (ZOCOR) 10 MG tablet, , Disp: , Rfl:     timolol (TIMOPTIC) 0.5 % ophthalmic solution, Administer 1 drop to both eyes Daily., Disp: , Rfl:     XIIDRA 5 % solution, , Disp: , Rfl:     Objective   Start weight: 170 pounds.    Total Loss lb/%Loss of beginning body weight (BBW): -12.2lb/-7.18%  Change in weight since last visit: -1.2    Recent Weight History:   Wt Readings from Last 6 Encounters:   10/18/24 71.6 kg (157 lb 12.8 oz)   09/18/24 72 kg (158 lb 12.8 oz)   08/20/24 72.8 kg (160 lb 9.6 oz)   07/18/24 73.5 kg (162 lb)   06/10/24 75.2 kg (165 lb 12.8 oz)   05/08/24 76.4 kg (168 lb 6.4 oz)       Body mass index is 23.99 kg/m².   Body composition analysis completed and showed:   Body Fat %: 39.6%    Measurements (in inches)  Waist Circumference: 39.5      Vital Signs:   /64   Pulse 68   Resp 18   Ht 172.7 cm (68\")   Wt 71.6 kg (157 lb 12.8 oz)   SpO2 98%   BMI 23.99 kg/m²     Physical Exam   General appears stated age and normal appearance   HEENT conjunctivae normal   Chest/lungs Normal rate, Regular rhythm, and Breathing is unlabored   Extremities    Neuro Good historian and No focal deficit   Skin    Psych normal behavior, normal thought content, and normal concentration       Common labs          4/24/2024    10:37 9/18/2024    09:01   Common Labs   Glucose 88     BUN 10     Creatinine 0.73     Sodium 141     Potassium 4.2     Chloride 102     Calcium 10.2     Total Protein 7.5     Albumin 4.6     Total Bilirubin 0.5     Alkaline Phosphatase 106     AST (SGOT) 20     ALT (SGPT) 18     WBC 5.1     Hemoglobin 14.4     Hematocrit 44.3     Platelets 270     Total Cholesterol 176   "   Triglycerides 97     HDL Cholesterol 58     LDL Cholesterol  100     Hemoglobin A1C 6.1  5.70      TSH          4/24/2024    10:37   TSH   TSH 3.430               Assessment / Plan        Diagnoses and all orders for this visit:    1. Encounter for weight loss counseling (Primary)  Overview:  Start weight: (170): 1st goal (10% weight loss): 153   Target Goal: 154    Current medication prescribed by MWM: Metformin 500bid  AOM excluded from plan    Rx Options: All (OK per ophthalmology); She is set on lifestyle modifications  Rx Caution: Topamax (Hx kidney stone), elevated eye pressure at risk for glaucoma (clearance OK from Dr. Matt Palomares ophthalmologist for P37, Wellbutrin, Topamax)    Comorbid conditions: HTN,HLD, GERD, IBS, Pre DM  Food: Struggles: sweets, carbs james breakfast/Lack of sleep cares for  with hemiparalysis from stroke through the night  Exercise: Treadmill 20 min about 5-7 days week  Personal goals/motivations: To feel better, clothes fit better    Pancreatitis: No   Glaucoma: No, however eye pressures are elevated, at risk for glaucoma  Headaches: Yes, infrequent   HTN:  Yes  Heart palpitations: No  Thyroid C cell cancer: No  MEN syndrome personal or family hx: No  Nephrolithiasis: Yes, 2015, lithotripsy, isolated event    Macronutrient daily goals:  Protein 100 g/day  Calories 3182-4780/day  Net carbs- 50-75/day  Water 85 oz/ day    Assessment & Plan:  Patient's (Body mass index is 23.99 kg/m².) indicates that they are within normal weight  with a history of being overweight/obesity, with health conditions that include hypertension, impaired fasting glucose, and dyslipidemias . Weight is improving with treatment. BMI is is above average; BMI management plan is completed. We discussed portion control, increasing exercise, pharmacologic options including metformin, and an velia-based approach such as [employer] Pal or Lose It.     I have instructed the patient to continue with pursuit of  medical weight loss as a part of this program. Patient does meet criteria for use of anorectics at this time as hyperlipidemia, hypertension, impaired fasting glucose, and is not at treatment goal.     The current plan for this month includes:   - 1 month f/u visit and she is down 1 lb on metformin and with lifestyle changes  - Continue to prioritize protein, fiber, and hydration.   - Continue Metformin for IR  - Treatment goal 154, she is only 3 lbs from reaching at this time    Continue all your great habits!! We are so close to reaching your goal.           2. Essential hypertension  Assessment & Plan:  Hypertension is stable and controlled  Weight loss.  Regular aerobic exercise.  Blood pressure will be reassessed  at next visit .      3. Impaired fasting glucose    4. Prediabetes  Overview:  A1C 5.7 (9/2024); A1C 6.1 4/2024 total insulin 10.2    Assessment & Plan:  Denies hypoglycemia. Continue low carb diet, regular physical activity, and weight reduction of 10-15%.   Continue Metformin      5. History of vitamin D deficiency  Overview:  Vit D 48 (9/2024); Vit D 11.7 4/2024, on supplement start 5/2024, recheck Sept 2024            Follow Up   Return in about 1 month (around 11/18/2024).  Patient was given instructions and counseling regarding her condition or for health maintenance advice. Please see specific information pulled into the AVS if appropriate.     Clara Del Toro, APRN  10/18/2024

## 2024-10-16 PROBLEM — Z86.39 HISTORY OF VITAMIN D DEFICIENCY: Status: ACTIVE | Noted: 2024-05-08

## 2024-10-18 ENCOUNTER — OFFICE VISIT (OUTPATIENT)
Dept: BARIATRICS/WEIGHT MGMT | Facility: CLINIC | Age: 54
End: 2024-10-18
Payer: COMMERCIAL

## 2024-10-18 VITALS
SYSTOLIC BLOOD PRESSURE: 118 MMHG | HEART RATE: 68 BPM | OXYGEN SATURATION: 98 % | DIASTOLIC BLOOD PRESSURE: 64 MMHG | RESPIRATION RATE: 18 BRPM | WEIGHT: 157.8 LBS | BODY MASS INDEX: 23.92 KG/M2 | HEIGHT: 68 IN

## 2024-10-18 DIAGNOSIS — Z86.39 HISTORY OF VITAMIN D DEFICIENCY: ICD-10-CM

## 2024-10-18 DIAGNOSIS — I10 ESSENTIAL HYPERTENSION: ICD-10-CM

## 2024-10-18 DIAGNOSIS — R73.03 PREDIABETES: ICD-10-CM

## 2024-10-18 DIAGNOSIS — Z71.3 ENCOUNTER FOR WEIGHT LOSS COUNSELING: Primary | ICD-10-CM

## 2024-10-18 DIAGNOSIS — R73.01 IMPAIRED FASTING GLUCOSE: ICD-10-CM

## 2024-10-18 NOTE — ASSESSMENT & PLAN NOTE
Hypertension is stable and controlled  Weight loss.  Regular aerobic exercise.  Blood pressure will be reassessed  at next visit .

## 2024-10-18 NOTE — ASSESSMENT & PLAN NOTE
Denies hypoglycemia. Continue low carb diet, regular physical activity, and weight reduction of 10-15%.   Continue Metformin

## 2024-10-18 NOTE — ASSESSMENT & PLAN NOTE
Patient's (Body mass index is 23.99 kg/m².) indicates that they are within normal weight  with a history of being overweight/obesity, with health conditions that include hypertension, impaired fasting glucose, and dyslipidemias . Weight is improving with treatment. BMI is is above average; BMI management plan is completed. We discussed portion control, increasing exercise, pharmacologic options including metformin, and an velia-based approach such as [facility] [facility] Pal or Lose It.     I have instructed the patient to continue with pursuit of medical weight loss as a part of this program. Patient does meet criteria for use of anorectics at this time as hyperlipidemia, hypertension, impaired fasting glucose, and is not at treatment goal.     The current plan for this month includes:   - 1 month f/u visit and she is down 1 lb on metformin and with lifestyle changes  - Continue to prioritize protein, fiber, and hydration.   - Continue Metformin for IR  - Treatment goal 154, she is only 3 lbs from reaching at this time    Continue all your great habits!! We are so close to reaching your goal.

## 2024-11-20 NOTE — PROGRESS NOTES
[facility]  [address]  [place] [address]     Office Note      Date: 2024  Patient Name: Agnieszka Fontenot  MRN: [id number]  : 1970    Subjective     Chief Complaint  Obesity Management follow-up          Agnieszka Fontenot presents to [facility] for obesity management.   Patient is satisfied with weight loss progress. Appetite is moderately controlled. On Metformin. Reports no side effects of prescribed medications today. The patient is taking multivitamin and is not taking fish oil.  The patient is using a food journal.  She is feeling good. She has been monitoring her sweet intake. She is not keeping sweets at home so feels there is not as much of a temptation. She will have annual visit and will have A1C rechecked in December.     The patient is exercising, walking often treadmill with a FITT score of:    Frequency Intensity Time Strength Training   []   0, none []   0 []   0 [x]   0   []   1 (1-2x/week) [x]   1 (light) []   1 (<10 min) []   1 (1x/week)   [x]   2 (3-5x/week) []   2 (moderate) []   2 (10-20 min) []   2 (2x/week)   []   3 (daily) []   3 (moderately hard)  []   4 (very hard) [x]   3 (20-30 min)  []   4 (>30 min) []   3 (3-4x/week)       Review of Systems   Constitutional:  Negative for appetite change and fatigue.   Eyes:  Negative for visual disturbance.   Cardiovascular:  Negative for chest pain and palpitations.   Gastrointestinal:  Negative for constipation and indigestion.   Neurological:  Negative for light-headedness.   All other systems reviewed and are negative.        Current Outpatient Medications:     ALLERGY SERUM INJECTION, Allergy  injections - monthly, Disp: , Rfl:     amLODIPine (NORVASC) 5 MG tablet, , Disp: , Rfl:     latanoprost (XALATAN) 0.005 % ophthalmic solution, Administer 1 drop to both eyes Every Night., Disp: , Rfl:     levocetirizine (XYZAL) 5 MG tablet, , Disp: , Rfl:     metFORMIN  "ER (GLUCOPHAGE-XR) 500 MG 24 hr tablet, Take 1 tablet by mouth 2 (Two) Times a Day With Meals., Disp: 60 tablet, Rfl: 2    montelukast (SINGULAIR) 10 MG tablet, Singulair 10 mg tablet  Daily, Disp: , Rfl:     multivitamin with minerals (Multivitamin Adults) tablet tablet, Take 1 tablet by mouth Daily., Disp: , Rfl:     omeprazole (priLOSEC) 40 MG capsule, Take 1 capsule by mouth 2 (Two) Times a Day Before Meals. Take a half hour before breakfast (Patient taking differently: Take 1 capsule by mouth Daily. Take a half hour before breakfast), Disp: 60 capsule, Rfl: 11    simvastatin (ZOCOR) 10 MG tablet, , Disp: , Rfl:     timolol (TIMOPTIC) 0.5 % ophthalmic solution, Administer 1 drop to both eyes Daily., Disp: , Rfl:     XIIDRA 5 % solution, , Disp: , Rfl:     Objective   Start weight: 170 pounds.    Total Loss lb/%Loss of beginning body weight (BBW): -15lb/-8.8%  Change in weight since last visit: -2.8    Recent Weight History:   Wt Readings from Last 6 Encounters:   11/26/24 70.3 kg (155 lb)   10/18/24 71.6 kg (157 lb 12.8 oz)   09/18/24 72 kg (158 lb 12.8 oz)   08/20/24 72.8 kg (160 lb 9.6 oz)   07/18/24 73.5 kg (162 lb)   06/10/24 75.2 kg (165 lb 12.8 oz)       Body mass index is 23.57 kg/m².   Body composition analysis completed and showed:   Body Fat %: 38.2    Measurements (in inches)  Waist Circumference: 39.5      Vital Signs:   /72 (BP Location: Right arm, Patient Position: Sitting)   Pulse 74   Ht 172.7 cm (68\")   Wt 70.3 kg (155 lb)   BMI 23.57 kg/m²     Physical Exam   General appears stated age and normal appearance   HEENT conjunctivae normal   Chest/lungs Normal rate and Breathing is unlabored   Extremities    Neuro Good historian and No focal deficit   Skin    Psych normal behavior, normal thought content, and normal concentration       Common labs          4/24/2024    10:37 9/18/2024    09:01   Common Labs   Glucose 88     BUN 10     Creatinine 0.73     Sodium 141     Potassium 4.2   "   Chloride 102     Calcium 10.2     Total Protein 7.5     Albumin 4.6     Total Bilirubin 0.5     Alkaline Phosphatase 106     AST (SGOT) 20     ALT (SGPT) 18     WBC 5.1     Hemoglobin 14.4     Hematocrit 44.3     Platelets 270     Total Cholesterol 176     Triglycerides 97     HDL Cholesterol 58     LDL Cholesterol  100     Hemoglobin A1C 6.1  5.70      TSH          4/24/2024    10:37   TSH   TSH 3.430               Assessment / Plan        Diagnoses and all orders for this visit:    1. Encounter for weight loss counseling (Primary)  Overview:  Start weight: (170): 1st goal (10% weight loss): 153   Target Goal: 154    Current medication prescribed by MWM: Metformin 500bid  AOM excluded from plan    Rx Options: All (OK per ophthalmology); She is set on lifestyle modifications  Rx Caution: Topamax (Hx kidney stone), elevated eye pressure at risk for glaucoma (clearance OK from Dr. Matt Palomares ophthalmologist for P37, Wellbutrin, Topamax)    Comorbid conditions: HTN,HLD, GERD, IBS, Pre DM  Food: Struggles: sweets, carbs james breakfast/Lack of sleep cares for  with hemiparalysis from stroke through the night  Exercise: Treadmill 20 min about 5-7 days week  Personal goals/motivations: To feel better, clothes fit better    Pancreatitis: No   Glaucoma: No, however eye pressures are elevated, at risk for glaucoma  Headaches: Yes, infrequent   HTN:  Yes  Heart palpitations: No  Thyroid C cell cancer: No  MEN syndrome personal or family hx: No  Nephrolithiasis: Yes, 2015, lithotripsy, isolated event    Macronutrient daily goals:  Protein 100 g/day  Calories 9909-9944/day  Net carbs- 50-75/day  Water 85 oz/ day    Assessment & Plan:  Patient's (Body mass index is 23.57 kg/m².) indicates that they are within normal weight  with a history of being overweight/obesity, with health conditions that include hypertension, dyslipidemias, and GERD . Weight is improving with treatment. BMI is is above average; BMI management  plan is completed. We discussed portion control, increasing exercise, pharmacologic options including metformin, and an velia-based approach such as [employer] Pal or Lose It.     I have instructed the patient to continue with pursuit of medical weight loss as a part of this program. Patient does meet criteria for use of anorectics at this time as hyperlipidemia and hypertension.     The current plan for this month includes:   - 1 month f/u visit, she is down 2 lbs with use of metformin, lifestyle changes  - Continue nutrition focus  - Continue to prioritize protein, fiber, and hydration.   - Continue metformin for IR  - Treatment goal 154, she is 1 lb away from treatment goal and 10% weight loss goal at this time. Great work!!        2. Prediabetes  Overview:  A1C 5.7 (9/2024); A1C 6.1 4/2024 total insulin 10.2    Assessment & Plan:  Continue low carb diet, regular physical activity, and weight reduction of 10-15%. Continue Metformin.       Orders:  -     metFORMIN ER (GLUCOPHAGE-XR) 500 MG 24 hr tablet; Take 1 tablet by mouth 2 (Two) Times a Day With Meals.  Dispense: 60 tablet; Refill: 2    3. Essential hypertension  Assessment & Plan:  Hypertension is stable and controlled  Continue current treatment regimen.  Weight loss.  Regular aerobic exercise.  Blood pressure will be reassessed  at next scheduled visit .            Follow Up   Return in about 1 month (around 12/26/2024).  Patient was given instructions and counseling regarding her condition or for health maintenance advice. Please see specific information pulled into the AVS if appropriate.     Clara Del Toro, GRISELDA  11/26/2024

## 2024-11-26 ENCOUNTER — OFFICE VISIT (OUTPATIENT)
Dept: BARIATRICS/WEIGHT MGMT | Facility: CLINIC | Age: 54
End: 2024-11-26
Payer: COMMERCIAL

## 2024-11-26 VITALS
BODY MASS INDEX: 23.49 KG/M2 | SYSTOLIC BLOOD PRESSURE: 120 MMHG | HEART RATE: 74 BPM | HEIGHT: 68 IN | DIASTOLIC BLOOD PRESSURE: 72 MMHG | WEIGHT: 155 LBS

## 2024-11-26 DIAGNOSIS — R73.03 PREDIABETES: ICD-10-CM

## 2024-11-26 DIAGNOSIS — I10 ESSENTIAL HYPERTENSION: ICD-10-CM

## 2024-11-26 DIAGNOSIS — Z71.3 ENCOUNTER FOR WEIGHT LOSS COUNSELING: Primary | ICD-10-CM

## 2024-11-26 RX ORDER — METFORMIN HYDROCHLORIDE 500 MG/1
500 TABLET, EXTENDED RELEASE ORAL 2 TIMES DAILY WITH MEALS
Qty: 60 TABLET | Refills: 2 | Status: SHIPPED | OUTPATIENT
Start: 2024-11-26

## 2024-11-26 NOTE — ASSESSMENT & PLAN NOTE
Patient's (Body mass index is 23.57 kg/m².) indicates that they are within normal weight  with a history of being overweight/obesity, with health conditions that include hypertension, dyslipidemias, and GERD . Weight is improving with treatment. BMI is is above average; BMI management plan is completed. We discussed portion control, increasing exercise, pharmacologic options including metformin, and an velia-based approach such as [employer] Pal or Lose It.     I have instructed the patient to continue with pursuit of medical weight loss as a part of this program. Patient does meet criteria for use of anorectics at this time as hyperlipidemia and hypertension.     The current plan for this month includes:   - 1 month f/u visit, she is down 2 lbs with use of metformin, lifestyle changes  - Continue nutrition focus  - Continue to prioritize protein, fiber, and hydration.   - Continue metformin for IR  - Treatment goal 154, she is 1 lb away from treatment goal and 10% weight loss goal at this time. Great work!!

## 2024-11-26 NOTE — ASSESSMENT & PLAN NOTE
Continue low carb diet, regular physical activity, and weight reduction of 10-15%. Continue Metformin.

## 2024-12-03 RX ORDER — OMEPRAZOLE 40 MG/1
CAPSULE, DELAYED RELEASE ORAL
Qty: 180 CAPSULE | Refills: 3 | Status: SHIPPED | OUTPATIENT
Start: 2024-12-03

## 2024-12-18 NOTE — PROGRESS NOTES
[facility]  [address]  [place] [address]     Office Note      Date: 2024  Patient Name: Agnieszka Fontenot  MRN: [id number]  : 1970    Subjective     Chief Complaint  Obesity Management follow-up          Agnieszka Fontenot presents to [facility] for obesity management.   Patient is satisfied with weight loss progress. Appetite is moderately controlled. On metformin. Reports no side effects of prescribed medications today. The patient is taking multivitamin and is not taking fish oil.  The patient is not using a food journal.  She has been doing well. She did see PCP recently and A1C was 5.8, no significant change despite on metformin. She does appreciate some recent chest pressure, substernal some burning in upper neck and pressure bilateral abdomen. She will see cardiology soon.       The patient is exercising with a FITT score of:    Frequency Intensity Time Strength Training   []   0, none []   0 []   0 [x]   0   []   1 (1-2x/week) [x]   1 (light) []   1 (<10 min) []   1 (1x/week)   [x]   2 (3-5x/week) []   2 (moderate) []   2 (10-20 min) []   2 (2x/week)   []   3 (daily) []   3 (moderately hard)  []   4 (very hard) [x]   3 (20-30 min)  []   4 (>30 min) []   3 (3-4x/week)       Review of Systems   Constitutional:  Negative for appetite change and fatigue.   Eyes:  Negative for visual disturbance.   Cardiovascular:  Negative for chest pain and palpitations.   Gastrointestinal:  Negative for constipation and indigestion.   Neurological:  Negative for light-headedness.         Current Outpatient Medications:     ALLERGY SERUM INJECTION, Allergy  injections - monthly, Disp: , Rfl:     amLODIPine (NORVASC) 5 MG tablet, , Disp: , Rfl:     latanoprost (XALATAN) 0.005 % ophthalmic solution, Administer 1 drop to both eyes Every Night., Disp: , Rfl:     levocetirizine (XYZAL) 5 MG tablet, , Disp: , Rfl:     metFORMIN ER  "(GLUCOPHAGE-XR) 500 MG 24 hr tablet, Take 1 tablet by mouth 2 (Two) Times a Day With Meals., Disp: 60 tablet, Rfl: 2    montelukast (SINGULAIR) 10 MG tablet, Singulair 10 mg tablet  Daily, Disp: , Rfl:     multivitamin with minerals (Multivitamin Adults) tablet tablet, Take 1 tablet by mouth Daily., Disp: , Rfl:     omeprazole (priLOSEC) 40 MG capsule, TAKE 1 CAPSULE BY MOUTH TWICE A DAY 30 MIN BEFORE A MEAL (Patient taking differently: Daily.), Disp: 180 capsule, Rfl: 3    simvastatin (ZOCOR) 10 MG tablet, , Disp: , Rfl:     timolol (TIMOPTIC) 0.5 % ophthalmic solution, Administer 1 drop to both eyes Daily., Disp: , Rfl:     XIIDRA 5 % solution, , Disp: , Rfl:     Objective   Start weight: 170 pounds.    Total Loss lb/%Loss of beginning body weight (BBW): -15.4lb/-9.05%  Change in weight since last visit: -0.4    Recent Weight History:   Wt Readings from Last 6 Encounters:   12/20/24 70.1 kg (154 lb 9.6 oz)   11/26/24 70.3 kg (155 lb)   10/18/24 71.6 kg (157 lb 12.8 oz)   09/18/24 72 kg (158 lb 12.8 oz)   08/20/24 72.8 kg (160 lb 9.6 oz)   07/18/24 73.5 kg (162 lb)       Body mass index is 23.51 kg/m².   Body composition analysis completed and showed:   Body Fat %: 38.6    Measurements (in inches)  Waist Circumference: 43      Vital Signs:   /74   Pulse 78   Ht 172.7 cm (68\")   Wt 70.1 kg (154 lb 9.6 oz)   BMI 23.51 kg/m²     Physical Exam   General appears stated age and normal appearance   HEENT    Chest/lungs Normal rate, Regular rhythm, and Breathing is unlabored   Extremities    Neuro Good historian and No focal deficit   Skin    Psych normal behavior, normal thought content, and normal concentration       Common labs          4/24/2024    10:37 9/18/2024    09:01   Common Labs   Glucose 88     BUN 10     Creatinine 0.73     Sodium 141     Potassium 4.2     Chloride 102     Calcium 10.2     Total Protein 7.5     Albumin 4.6     Total Bilirubin 0.5     Alkaline Phosphatase 106     AST (SGOT) 20   "   ALT (SGPT) 18     WBC 5.1     Hemoglobin 14.4     Hematocrit 44.3     Platelets 270     Total Cholesterol 176     Triglycerides 97     HDL Cholesterol 58     LDL Cholesterol  100     Hemoglobin A1C 6.1  5.70      TSH          4/24/2024    10:37   TSH   TSH 3.430               Assessment / Plan        Diagnoses and all orders for this visit:    1. Encounter for weight loss counseling (Primary)  Overview:  Start weight: (170): 1st goal (10% weight loss): 153   Target Goal: 154    Current medication prescribed by [facility]: Metformin 500bid  AOM excluded from plan    Rx Options: All (OK per ophthalmology); She is set on lifestyle modifications  Rx Caution: Topamax (Hx kidney stone), elevated eye pressure at risk for glaucoma (clearance OK from Dr. Matt Palomares ophthalmologist for P37, Wellbutrin, Topamax)    Comorbid conditions: HTN,HLD, GERD, IBS, Pre DM  Food: Struggles: sweets, carbs james breakfast/Lack of sleep cares for  with hemiparalysis from stroke through the night  Exercise: Treadmill 20 min about 5-7 days week  Personal goals/motivations: To feel better, clothes fit better    Pancreatitis: No   Glaucoma: No, however eye pressures are elevated, at risk for glaucoma  Headaches: Yes, infrequent   HTN:  Yes  Heart palpitations: No  Thyroid C cell cancer: No  MEN syndrome personal or family hx: No  Nephrolithiasis: Yes, 2015, lithotripsy, isolated event    Macronutrient daily goals:  Protein 100 g/day  Calories 1210-5485/day  Net carbs- 50-75/day  Water 85 oz/ day    Assessment & Plan:  Patient's (Body mass index is 23.51 kg/m².) indicates that they are within normal weight  with a history of being overweight/obesity, with health conditions that include hypertension, impaired fasting glucose, and dyslipidemias . Weight is improving with treatment. BMI is is above average; BMI management plan is completed. We discussed portion control, increasing exercise, pharmacologic options including metformin, and an  velia-based approach such as [employer] or Lose It.     I have instructed the patient to continue with pursuit of medical weight loss as a part of this program. Patient does meet criteria for use of anorectics at this time as hyperlipidemia, hypertension, and this medication is indicated for LONG TERM use for management of obesity.     The current plan for this month includes:   - 1 month f/u she is down 1 lb on metformin  - Continue to work on lifestyle behavioral changes  - Continue to prioritize protein, fiber, and hydration.   - continue metformin for pre DM, discussed increasing metformin but she would like to continue current dose. Plan for A1C recheck 3 months  - advised to follow with cardiology for recent chest pain  - treatment goal met today    Continue your great work with weight maintenance!!         2. Prediabetes  Overview:  A1C 5.7 (9/2024); A1C 6.1 4/2024 total insulin 10.2    Assessment & Plan:  Continue low carb diet, regular physical activity, and weight reduction of 10-15%.   Continue metformin        3. Essential hypertension  Assessment & Plan:  Hypertension is stable and controlled  Continue current treatment regimen.  Weight loss.  Regular aerobic exercise.  Blood pressure will be reassessed  next scheduled visit .            Follow Up   Return in about 1 month (around 1/20/2025).  Patient was given instructions and counseling regarding her condition or for health maintenance advice. Please see specific information pulled into the AVS if appropriate.     Clara Del Toro, APRN  12/20/2024

## 2024-12-20 ENCOUNTER — OFFICE VISIT (OUTPATIENT)
Dept: BARIATRICS/WEIGHT MGMT | Facility: CLINIC | Age: 54
End: 2024-12-20
Payer: COMMERCIAL

## 2024-12-20 VITALS
HEIGHT: 68 IN | SYSTOLIC BLOOD PRESSURE: 126 MMHG | HEART RATE: 78 BPM | BODY MASS INDEX: 23.43 KG/M2 | WEIGHT: 154.6 LBS | DIASTOLIC BLOOD PRESSURE: 74 MMHG

## 2024-12-20 DIAGNOSIS — Z71.3 ENCOUNTER FOR WEIGHT LOSS COUNSELING: Primary | ICD-10-CM

## 2024-12-20 DIAGNOSIS — I10 ESSENTIAL HYPERTENSION: ICD-10-CM

## 2024-12-20 DIAGNOSIS — R73.03 PREDIABETES: ICD-10-CM

## 2024-12-20 NOTE — ASSESSMENT & PLAN NOTE
Continue low carb diet, regular physical activity, and weight reduction of 10-15%.   Continue metformin

## 2024-12-20 NOTE — ASSESSMENT & PLAN NOTE
Hypertension is stable and controlled  Continue current treatment regimen.  Weight loss.  Regular aerobic exercise.  Blood pressure will be reassessed  next scheduled visit .   36.4

## 2025-01-06 ENCOUNTER — PATIENT ROUNDING (BHMG ONLY) (OUTPATIENT)
Dept: CARDIOLOGY | Facility: CLINIC | Age: 55
End: 2025-01-06
Payer: COMMERCIAL

## 2025-01-06 ENCOUNTER — OFFICE VISIT (OUTPATIENT)
Dept: CARDIOLOGY | Facility: CLINIC | Age: 55
End: 2025-01-06
Payer: COMMERCIAL

## 2025-01-06 VITALS
SYSTOLIC BLOOD PRESSURE: 130 MMHG | WEIGHT: 159 LBS | HEART RATE: 64 BPM | HEIGHT: 68 IN | DIASTOLIC BLOOD PRESSURE: 70 MMHG | BODY MASS INDEX: 24.1 KG/M2 | OXYGEN SATURATION: 97 %

## 2025-01-06 DIAGNOSIS — R07.89 CHEST PAIN, ATYPICAL: Primary | ICD-10-CM

## 2025-01-06 RX ORDER — SODIUM CHLORIDE 0.9 % (FLUSH) 0.9 %
10 SYRINGE (ML) INJECTION EVERY 12 HOURS SCHEDULED
OUTPATIENT
Start: 2025-01-06

## 2025-01-06 RX ORDER — IVABRADINE 5 MG/1
15 TABLET, FILM COATED ORAL ONCE
OUTPATIENT
Start: 2025-01-06 | End: 2025-01-06

## 2025-01-06 RX ORDER — METOPROLOL TARTRATE 1 MG/ML
5 INJECTION, SOLUTION INTRAVENOUS
OUTPATIENT
Start: 2025-01-06

## 2025-01-06 RX ORDER — METOPROLOL TARTRATE 50 MG
TABLET ORAL
Qty: 2 TABLET | Refills: 0 | Status: SHIPPED | OUTPATIENT
Start: 2025-01-06

## 2025-01-06 RX ORDER — METOPROLOL TARTRATE 25 MG/1
50 TABLET, FILM COATED ORAL ONCE
OUTPATIENT
Start: 2025-01-06

## 2025-01-06 RX ORDER — METOPROLOL TARTRATE 25 MG/1
100 TABLET, FILM COATED ORAL ONCE
OUTPATIENT
Start: 2025-01-06

## 2025-01-06 RX ORDER — NITROGLYCERIN 0.4 MG/1
0.8 TABLET SUBLINGUAL
OUTPATIENT
Start: 2025-01-06

## 2025-01-06 RX ORDER — METOPROLOL TARTRATE 25 MG/1
200 TABLET, FILM COATED ORAL ONCE
OUTPATIENT
Start: 2025-01-06 | End: 2025-01-06

## 2025-01-06 RX ORDER — METOPROLOL TARTRATE 25 MG/1
25 TABLET, FILM COATED ORAL ONCE
OUTPATIENT
Start: 2025-01-06

## 2025-01-06 RX ORDER — METOPROLOL TARTRATE 25 MG/1
150 TABLET, FILM COATED ORAL ONCE
OUTPATIENT
Start: 2025-01-06

## 2025-01-06 RX ORDER — NITROGLYCERIN 0.4 MG/1
0.4 TABLET SUBLINGUAL
OUTPATIENT
Start: 2025-01-06 | End: 2025-01-06

## 2025-01-06 RX ORDER — SODIUM CHLORIDE 9 MG/ML
40 INJECTION, SOLUTION INTRAVENOUS AS NEEDED
OUTPATIENT
Start: 2025-01-06

## 2025-01-06 RX ORDER — METOPROLOL TARTRATE 50 MG
50 TABLET ORAL
OUTPATIENT
Start: 2025-01-06

## 2025-01-06 RX ORDER — SODIUM CHLORIDE 0.9 % (FLUSH) 0.9 %
10 SYRINGE (ML) INJECTION AS NEEDED
OUTPATIENT
Start: 2025-01-06

## 2025-01-06 NOTE — PROGRESS NOTES
"January 6, 2025    Hello, may I speak with Agnieszka Fontenot? Yes.    My name is Regina ORTEGA.      I am  with [facility]  [address]  [address]  [facility] [address] [phone].    Before we get started may I verify your date of birth? 1970, Yes, correct.    I am calling to officially welcome you to our practice and ask about your recent visit. Is this a good time to talk? yes    Tell me about your visit with us. What things went well?  Everything.  Very informative & thorough.       We're always looking for ways to make our patients' experiences even better. Do you have recommendations on ways we may improve?  no, \"everything was fine.\"    Overall were you satisfied with your first visit to our practice? yes       I appreciate you taking the time to speak with me today. Is there anything else I can do for you? no      Thank you, and have a great day.      "

## 2025-01-06 NOTE — PROGRESS NOTES
Subjective:     Encounter Date:2025    Primary Care Physician: Nagi Mathews MD      Patient ID: Agnieszka Fontenot is a 54 y.o. female.    Chief Complaint:Family History of Heart Disease (NP)    PROBLEM LIST:  Family history of CAD  Hypertension  Dyslipidemia  Prediabetes  GERD  Nephrolithiasis  Reactive airway disease  Scoliosis  Surgeries:  Ball teeth extraction  Tonsillectomy  Oral surgery  Cholecystectomy   section  Back surgery     Past Medical History:   Diagnosis Date    Elevated cholesterol     Encounter for weight loss counseling 2024    GERD (gastroesophageal reflux disease)     History of colon polyps     History of degenerative disc disease     History of mammogram 2019-negative    Hypercholesterolemia     Hypertension     Indigestion     dx with dyspepsia; found to be caused by GERD    Kidney stone Mar 2015    Multiple gestation 99    Respiratory disorder     reactive airway disease    Scoliosis      Past Surgical History:   Procedure Laterality Date    BACK SURGERY      blood transfusion, Kevan Rods/ Dr. Meek Zee     SECTION  2001    Dr. Lopez    CHOLECYSTECTOMY  10/2011    no comp/ Dr. Suazo    ENDOSCOPY      EGD    MOUTH SURGERY      TONSILLECTOMY      UPPER GASTROINTESTINAL ENDOSCOPY      WISDOM TOOTH EXTRACTION         No Known Allergies      Current Outpatient Medications:     ALLERGY SERUM INJECTION, Allergy  injections - monthly, Disp: , Rfl:     amLODIPine (NORVASC) 5 MG tablet, , Disp: , Rfl:     latanoprost (XALATAN) 0.005 % ophthalmic solution, Administer 1 drop to both eyes Every Night., Disp: , Rfl:     levocetirizine (XYZAL) 5 MG tablet, , Disp: , Rfl:     metFORMIN ER (GLUCOPHAGE-XR) 500 MG 24 hr tablet, Take 1 tablet by mouth 2 (Two) Times a Day With Meals. (Patient taking differently: Take 2 tablets by mouth Every Night.), Disp: 60 tablet, Rfl: 2    montelukast (SINGULAIR) 10 MG tablet, Singulair 10 mg tablet   "Daily, Disp: , Rfl:     multivitamin with minerals (Multivitamin Adults) tablet tablet, Take 1 tablet by mouth Daily., Disp: , Rfl:     omeprazole (priLOSEC) 40 MG capsule, TAKE 1 CAPSULE BY MOUTH TWICE A DAY 30 MIN BEFORE A MEAL (Patient taking differently: Daily.), Disp: 180 capsule, Rfl: 3    simvastatin (ZOCOR) 10 MG tablet, , Disp: , Rfl:     timolol (TIMOPTIC) 0.5 % ophthalmic solution, Administer 1 drop to both eyes Daily., Disp: , Rfl:     XIIDRA 5 % solution, , Disp: , Rfl:         History of Present Illness    54-year-old female with history of hypertension dyslipidemia prediabetes and positive family history of coronary disease who were asked to see for 8.  Patient is active, is recently diagnosed with prediabetes and started walking on the treadmill 20 minutes 4 times a week.  She has no symptoms with doing this.  Approxi-1 year ago she had an episode of severe substernal chest pain with associated burning up to her jaw and ears.  Had some tingling in her arms with this as well.  This lasted approximate 30 to 45 minutes for resolution spontaneously.  This recurred in July of this year, and she saw her gastroenterologist who did an EGD and colonoscopy both of which were normal.  She has been maintained on PPI throughout all this.  Approximately 1 month ago she had another episode of severe substernal chest pressure without radiation this time chronic, in which she describes as a \"squeezing\" through her ribs.  Lasted again 30 minutes before spontaneous relief.  No associated dizziness orthopnea PND tachypalpitations or presyncope.    The following portions of the patient's history were reviewed and updated as appropriate: allergies, current medications, past family history, past medical history, past social history, past surgical history and problem list.    Family History   Problem Relation Age of Onset    Arthritis Mother     Hypertension Mother     Heart attack Mother     Heart disease Father     " "Arthritis Father     Hyperlipidemia Father     Hypertension Father     Thyroid disease Father     Depression Father     Pulmonary fibrosis Father     Arthritis Sister     Depression Sister     Breast cancer Paternal Aunt         UNK, still living, dx in her 40's    Heart disease Paternal Aunt     Heart disease Paternal Uncle     Stroke Maternal Grandfather     Heart attack Paternal Grandmother     Heart disease Paternal Grandmother     Osteoporosis Paternal Grandmother     Thyroid disease Paternal Grandmother     Heart disease Paternal Grandfather     Cancer Paternal Grandfather     Arthritis Paternal Grandfather     Depression Daughter     Ovarian cancer Neg Hx     Colon cancer Neg Hx     Colon polyps Neg Hx        Social History     Tobacco Use    Smoking status: Never     Passive exposure: Never    Smokeless tobacco: Never   Vaping Use    Vaping status: Never Used   Substance Use Topics    Alcohol use: No    Drug use: No         Review of Systems   Cardiovascular:  Positive for chest pain.   Gastrointestinal:  Positive for heartburn.   All other systems reviewed and are negative.         Objective:   /70 (BP Location: Right arm, Patient Position: Sitting, Cuff Size: Adult)   Pulse 64   Ht 172.7 cm (68\")   Wt 72.1 kg (159 lb)   LMP 10/01/2023 (Approximate)   SpO2 97%   BMI 24.18 kg/m²         Vitals reviewed.   Constitutional:       Appearance: Healthy appearance. Well-developed and not in distress.   Eyes:      Conjunctiva/sclera: Conjunctivae normal.      Pupils: Pupils are equal, round, and reactive to light.   HENT:      Head: Normocephalic and atraumatic.    Mouth/Throat:      Pharynx: Oropharynx is clear.   Neck:      Thyroid: Thyroid normal. No thyromegaly.      Vascular: Normal carotid pulses. No carotid bruit or JVD. JVD normal.      Lymphadenopathy: No cervical adenopathy.   Pulmonary:      Effort: No respiratory distress.      Breath sounds: No wheezing. No rales.   Chest:      Chest wall: " Not tender to palpatation.   Cardiovascular:      Normal rate. Regular rhythm.      No gallop.    Pulses:     Carotid: 2+ bilaterally.     Dorsalis pedis: 2+ bilaterally.     Posterior tibial: 2+ bilaterally.  Edema:     Peripheral edema absent.   Abdominal:      General: There is no distension or abdominal bruit.      Palpations: There is no abdominal mass.      Tenderness: There is no abdominal tenderness. There is no rebound.   Musculoskeletal:         General: No tenderness or deformity.      Extremities: No clubbing present.Skin:     General: Skin is warm and dry. There is no cyanosis.      Findings: No rash.   Neurological:      Mental Status: Alert, oriented to person, place, and time and oriented to person, place and time.           ECG 12 Lead    Date/Time: 1/6/2025 1:42 PM  Performed by: Alex Oliva MD    Authorized by: Alex Oliva MD  Rhythm: sinus rhythm  Other findings: non-specific ST-T wave changes    Clinical impression: non-specific ECG                Assessment:   Assessment & Plan      Diagnoses and all orders for this visit:    1. Chest pain, atypical (Primary)    Other orders  -     ECG 12 Lead      1.  Chest pain, atypical.  Nonexertional.  May be esophageal spasm.  Need to rule out coronary disease.  2.  Prediabetes, on metformin with hemoglobin A1c decreased to 6.15.7  3.  Dyslipidemia on statin last LDL of 100  4.  Hypertension, well-controlled on amlodipine  5.  Known GERD on chronic PPI  6.  Positive family history of coronary disease  7.  Abnormal EKG with nonspecific ST segment changes, which are new compared to a tracing performed in primary care physician's office in July of last year.    Recommendations:  1.  Continue current medical therapy for now.  2.  Check coronary CTA at patient's earliest convenience  3.  Check echocardiogram  4.  Patient already has a prescription for nitroglycerin to take as needed.  Instructed to its use.  5.  If above cardiovascular test are  normal, we will treat her for presumptive diagnosis of esophageal spasm with as needed nitroglycerin.  6.  Pending the above, may need escalation of dosage of her statin.  7.  Further recommendations after the above testing.       Sara VILLALOBOS scribed portions of this dictation for Dr. Alex Oliva.     I have seen and examined the patient, I have reviewed the note, discussed the case with the advance practice clinician, made necessary changes and I agree with the final note.    Alex Oliva MD  01/06/25  13:44 EST            Dictated utilizing Dragon dictation

## 2025-01-16 NOTE — PROGRESS NOTES
[facility]  [address]  [place] [address]     Office Note      Date: 2025  Patient Name: Agnieszka Fontenot  MRN: [id number]  : 1970    Subjective     Chief Complaint  Obesity Management follow-up          Agnieszka Fontenot presents to [facility] for obesity management.   Patient is satisfied with weight loss progress. Appetite is moderately controlled. On metformin. Reports no side effects of prescribed medications today. The patient is taking multivitamin and is taking fish oil.  The patient is not using a food journal.  She did meet with cardiology and will have some non-invasive testing at the end of this month with coronary CTA, echo.       The patient is exercising with a FITT score of:    Frequency Intensity Time Strength Training   []   0, none []   0 []   0 [x]   0   []   1 (1-2x/week) [x]   1 (light) []   1 (<10 min) []   1 (1x/week)   [x]   2 (3-5x/week) []   2 (moderate) []   2 (10-20 min) []   2 (2x/week)   []   3 (daily) []   3 (moderately hard)  []   4 (very hard) [x]   3 (20-30 min)  []   4 (>30 min) []   3 (3-4x/week)       Review of Systems   Constitutional:  Negative for appetite change and fatigue.   Eyes:  Negative for visual disturbance.   Cardiovascular:  Negative for chest pain and palpitations.   Gastrointestinal:  Negative for constipation and indigestion.   Neurological:  Negative for light-headedness.   All other systems reviewed and are negative.        Current Outpatient Medications:     ALLERGY SERUM INJECTION, Allergy  injections - monthly, Disp: , Rfl:     amLODIPine (NORVASC) 5 MG tablet, , Disp: , Rfl:     latanoprost (XALATAN) 0.005 % ophthalmic solution, Administer 1 drop to both eyes Every Night., Disp: , Rfl:     levocetirizine (XYZAL) 5 MG tablet, , Disp: , Rfl:     metFORMIN ER (GLUCOPHAGE-XR) 500 MG 24 hr tablet, Take 2 tablets by mouth Every Night., Disp: 60 tablet, Rfl: 0    metoprolol  "tartrate (LOPRESSOR) 50 MG tablet, Take 50 mg at Bedtime the Night Before Coronary CTA Appointment and In the Morning 1 Hour Prior to Coronary CTA Appointment. Do not take if heart rate less than 60., Disp: 2 tablet, Rfl: 0    montelukast (SINGULAIR) 10 MG tablet, Singulair 10 mg tablet  Daily, Disp: , Rfl:     multivitamin with minerals (Multivitamin Adults) tablet tablet, Take 1 tablet by mouth Daily., Disp: , Rfl:     omeprazole (priLOSEC) 40 MG capsule, TAKE 1 CAPSULE BY MOUTH TWICE A DAY 30 MIN BEFORE A MEAL (Patient taking differently: Daily.), Disp: 180 capsule, Rfl: 3    simvastatin (ZOCOR) 10 MG tablet, , Disp: , Rfl:     timolol (TIMOPTIC) 0.5 % ophthalmic solution, Administer 1 drop to both eyes Daily., Disp: , Rfl:     XIIDRA 5 % solution, , Disp: , Rfl:     Objective   Start weight: 170 pounds.    Total Loss lb/%Loss of beginning body weight (BBW): -17.2 lb/-10 %  Change in weight since last visit: -1.8 lb    Recent Weight History:   Wt Readings from Last 6 Encounters:   01/21/25 69.3 kg (152 lb 12.8 oz)   01/06/25 72.1 kg (159 lb)   12/20/24 70.1 kg (154 lb 9.6 oz)   11/26/24 70.3 kg (155 lb)   10/18/24 71.6 kg (157 lb 12.8 oz)   09/18/24 72 kg (158 lb 12.8 oz)       Body mass index is 23.23 kg/m².   Body composition analysis completed and showed:   Body Fat %: 38.6    Measurements (in inches)  Waist Circumference: 41      Vital Signs:   /70 (BP Location: Left arm, Patient Position: Sitting, Cuff Size: Adult)   Pulse 77   Ht 172.7 cm (68\")   Wt 69.3 kg (152 lb 12.8 oz)   BMI 23.23 kg/m²     Physical Exam   General appears stated age and normal appearance   HEENT    Chest/lungs Normal rate, Regular rhythm, and Breathing is unlabored   Extremities    Neuro Good historian and No focal deficit   Skin    Psych normal behavior, normal thought content, and normal concentration       Common labs          4/24/2024    10:37 9/18/2024    09:01   Common Labs   Glucose 88     BUN 10     Creatinine 0.73   "   Sodium 141     Potassium 4.2     Chloride 102     Calcium 10.2     Total Protein 7.5     Albumin 4.6     Total Bilirubin 0.5     Alkaline Phosphatase 106     AST (SGOT) 20     ALT (SGPT) 18     WBC 5.1     Hemoglobin 14.4     Hematocrit 44.3     Platelets 270     Total Cholesterol 176     Triglycerides 97     HDL Cholesterol 58     LDL Cholesterol  100     Hemoglobin A1C 6.1  5.70      TSH          4/24/2024    10:37   TSH   TSH 3.430               Assessment / Plan        Diagnoses and all orders for this visit:    1. Encounter for weight loss counseling (Primary)  Overview:  Start weight: (170): 1st goal (10% weight loss): 153   Target Goal: 154 (reached 1/2025)    Current medication prescribed by [facility]: Metformin 500bid  AOM excluded from plan    Rx Options: All (OK per ophthalmology); She is set on lifestyle modifications  Rx Caution: Topamax (Hx kidney stone), elevated eye pressure at risk for glaucoma (clearance OK from Dr. Matt Palomares ophthalmologist for P37, Wellbutrin, Topamax)    Comorbid conditions: HTN,HLD, GERD, IBS, Pre DM  Food: Struggles: sweets, carbs james breakfast/Lack of sleep cares for  with hemiparalysis from stroke through the night  Exercise: Treadmill 20 min about 5-7 days week  Personal goals/motivations: To feel better, clothes fit better    Pancreatitis: No   Glaucoma: No, however eye pressures are elevated, at risk for glaucoma  Headaches: Yes, infrequent   HTN:  Yes  Heart palpitations: No  Thyroid C cell cancer: No  MEN syndrome personal or family hx: No  Nephrolithiasis: Yes, 2015, lithotripsy, isolated event    Macronutrient daily goals:  Protein 100 g/day  Calories 6531-6099/day  Net carbs- 50-75/day  Water 85 oz/ day    Assessment & Plan:  Patient's (Body mass index is 23.23 kg/m².) indicates that they are within normal weight  with a history of being overweight/obesity, with health conditions that include hypertension, impaired fasting glucose, and dyslipidemias . Weight is  improving with treatment. BMI is is above average; BMI management plan is completed. We discussed portion control, increasing exercise, pharmacologic options including metformin, and an velia-based approach such as Provigent Pal or Lose It.     I have instructed the patient to continue with pursuit of medical weight loss as a part of this program. Patient does meet criteria for use of anorectics at this time as body fat percentage >30% and is not at treatment goal.     The current plan for this month includes:   - 1 month f/u visit, she is down 2 lbs on metformin and with lifestyle changes  - Continue to work on lifestyle behavioral changes  - Continue nutrition focus  - Continue to prioritize protein, fiber, and hydration.   - continue metformin for IR  - target weight goal has been reached, continue maintenance weight at this time        2. Essential hypertension    3. Prediabetes  Overview:  A1C 5.7 (9/2024); A1C 6.1 4/2024 total insulin 10.2    Assessment & Plan:  Improving with lifestyle changes. Continue low carb diet, regular physical activity, and weight reduction of 10-15%.   Continue Metformin      Orders:  -     metFORMIN ER (GLUCOPHAGE-XR) 500 MG 24 hr tablet; Take 2 tablets by mouth Every Night.  Dispense: 60 tablet; Refill: 0          Follow Up   Return in about 2 months (around 3/21/2025).  Patient was given instructions and counseling regarding her condition or for health maintenance advice. Please see specific information pulled into the AVS if appropriate.     Clara Del Toro, APRN  01/21/2025

## 2025-01-21 ENCOUNTER — OFFICE VISIT (OUTPATIENT)
Dept: BARIATRICS/WEIGHT MGMT | Facility: CLINIC | Age: 55
End: 2025-01-21
Payer: COMMERCIAL

## 2025-01-21 VITALS
HEIGHT: 68 IN | BODY MASS INDEX: 23.16 KG/M2 | SYSTOLIC BLOOD PRESSURE: 112 MMHG | HEART RATE: 77 BPM | DIASTOLIC BLOOD PRESSURE: 70 MMHG | WEIGHT: 152.8 LBS

## 2025-01-21 DIAGNOSIS — I10 ESSENTIAL HYPERTENSION: ICD-10-CM

## 2025-01-21 DIAGNOSIS — R73.03 PREDIABETES: ICD-10-CM

## 2025-01-21 DIAGNOSIS — Z71.3 ENCOUNTER FOR WEIGHT LOSS COUNSELING: Primary | ICD-10-CM

## 2025-01-21 PROCEDURE — 99214 OFFICE O/P EST MOD 30 MIN: CPT

## 2025-01-21 RX ORDER — METFORMIN HYDROCHLORIDE 500 MG/1
1000 TABLET, EXTENDED RELEASE ORAL NIGHTLY
Qty: 60 TABLET | Refills: 0 | Status: SHIPPED | OUTPATIENT
Start: 2025-01-21

## 2025-01-21 NOTE — ASSESSMENT & PLAN NOTE
Improving with lifestyle changes. Continue low carb diet, regular physical activity, and weight reduction of 10-15%.   Continue Metformin

## 2025-01-21 NOTE — ASSESSMENT & PLAN NOTE
Patient's (Body mass index is 23.23 kg/m².) indicates that they are within normal weight  with a history of being overweight/obesity, with health conditions that include hypertension, impaired fasting glucose, and dyslipidemias . Weight is improving with treatment. BMI is is above average; BMI management plan is completed. We discussed portion control, increasing exercise, pharmacologic options including metformin, and an velia-based approach such as Minimally invasive devices Pal or Lose It.     I have instructed the patient to continue with pursuit of medical weight loss as a part of this program. Patient does meet criteria for use of anorectics at this time as body fat percentage >30% and is not at treatment goal.     The current plan for this month includes:   - 1 month f/u visit, she is down 2 lbs on metformin and with lifestyle changes  - Continue to work on lifestyle behavioral changes  - Continue nutrition focus  - Continue to prioritize protein, fiber, and hydration.   - continue metformin for IR  - target weight goal has been reached, continue maintenance weight at this time

## 2025-01-29 ENCOUNTER — TELEPHONE (OUTPATIENT)
Facility: HOSPITAL | Age: 55
End: 2025-01-29
Payer: COMMERCIAL

## 2025-01-29 NOTE — TELEPHONE ENCOUNTER
Pt contacted as pre-procedure phone call prior to planned CTA coronary for 1/30/25. Reviewed with patient arrival time of 0930 to main registration,  no caffeine after midnight, please take premedications night before and morning of procedure with a small sip of water as instructed,  recommended,  reviewed procedure instructions and allowed time for questions, and reviewed home medications, allergies, and medical history.

## 2025-01-30 ENCOUNTER — HOSPITAL ENCOUNTER (OUTPATIENT)
Facility: HOSPITAL | Age: 55
Discharge: HOME | End: 2025-01-30
Payer: COMMERCIAL

## 2025-01-30 VITALS
OXYGEN SATURATION: 98 % | SYSTOLIC BLOOD PRESSURE: 110 MMHG | HEIGHT: 68 IN | HEART RATE: 61 BPM | DIASTOLIC BLOOD PRESSURE: 57 MMHG | RESPIRATION RATE: 13 BRPM | BODY MASS INDEX: 23.47 KG/M2 | WEIGHT: 154.87 LBS | TEMPERATURE: 96.9 F

## 2025-01-30 VITALS — BODY MASS INDEX: 23.15 KG/M2 | HEIGHT: 68 IN | WEIGHT: 152.78 LBS

## 2025-01-30 DIAGNOSIS — R07.89 CHEST PAIN, ATYPICAL: ICD-10-CM

## 2025-01-30 LAB
ASCENDING AORTA: 2.8 CM
AV MEAN PRESS GRAD SYS DOP V1V2: 5 MMHG
AV VMAX SYS DOP: 148 CM/SEC
B-HCG UR QL: NEGATIVE
BH CV ECHO MEAS - AO MAX PG: 8.8 MMHG
BH CV ECHO MEAS - AO ROOT DIAM: 2.4 CM
BH CV ECHO MEAS - AO V2 VTI: 33.9 CM
BH CV ECHO MEAS - AVA(I,D): 1.88 CM2
BH CV ECHO MEAS - EDV(CUBED): 132.7 ML
BH CV ECHO MEAS - EDV(MOD-SP2): 62 ML
BH CV ECHO MEAS - EDV(MOD-SP4): 73.5 ML
BH CV ECHO MEAS - EF(MOD-SP2): 52.7 %
BH CV ECHO MEAS - EF(MOD-SP4): 53.1 %
BH CV ECHO MEAS - ESV(CUBED): 24.4 ML
BH CV ECHO MEAS - ESV(MOD-SP2): 29.3 ML
BH CV ECHO MEAS - ESV(MOD-SP4): 34.5 ML
BH CV ECHO MEAS - FS: 43.1 %
BH CV ECHO MEAS - IVS/LVPW: 1 CM
BH CV ECHO MEAS - IVSD: 0.8 CM
BH CV ECHO MEAS - LA DIMENSION: 2.9 CM
BH CV ECHO MEAS - LAT PEAK E' VEL: 11.3 CM/SEC
BH CV ECHO MEAS - LV DIASTOLIC VOL/BSA (35-75): 40.4 CM2
BH CV ECHO MEAS - LV MASS(C)D: 140.5 GRAMS
BH CV ECHO MEAS - LV MAX PG: 4.6 MMHG
BH CV ECHO MEAS - LV MEAN PG: 2 MMHG
BH CV ECHO MEAS - LV SYSTOLIC VOL/BSA (12-30): 19 CM2
BH CV ECHO MEAS - LV V1 MAX: 107 CM/SEC
BH CV ECHO MEAS - LV V1 VTI: 22.5 CM
BH CV ECHO MEAS - LVIDD: 5.1 CM
BH CV ECHO MEAS - LVIDS: 2.9 CM
BH CV ECHO MEAS - LVOT AREA: 2.8 CM2
BH CV ECHO MEAS - LVOT DIAM: 1.9 CM
BH CV ECHO MEAS - LVPWD: 0.8 CM
BH CV ECHO MEAS - MED PEAK E' VEL: 7.9 CM/SEC
BH CV ECHO MEAS - MV A MAX VEL: 81.8 CM/SEC
BH CV ECHO MEAS - MV DEC SLOPE: 345 CM/SEC2
BH CV ECHO MEAS - MV DEC TIME: 0.19 SEC
BH CV ECHO MEAS - MV E MAX VEL: 77 CM/SEC
BH CV ECHO MEAS - MV E/A: 0.94
BH CV ECHO MEAS - MV MAX PG: 4.8 MMHG
BH CV ECHO MEAS - MV MEAN PG: 2 MMHG
BH CV ECHO MEAS - MV P1/2T: 94.2 MSEC
BH CV ECHO MEAS - MV V2 VTI: 39 CM
BH CV ECHO MEAS - MVA(P1/2T): 2.33 CM2
BH CV ECHO MEAS - MVA(VTI): 1.64 CM2
BH CV ECHO MEAS - PA ACC TIME: 0.16 SEC
BH CV ECHO MEAS - PI END-D VEL: 77 CM/SEC
BH CV ECHO MEAS - RAP SYSTOLE: 3 MMHG
BH CV ECHO MEAS - RVSP: 20 MMHG
BH CV ECHO MEAS - SV(LVOT): 63.8 ML
BH CV ECHO MEAS - SV(MOD-SP2): 32.7 ML
BH CV ECHO MEAS - SV(MOD-SP4): 39 ML
BH CV ECHO MEAS - SVI(LVOT): 35.1 ML/M2
BH CV ECHO MEAS - SVI(MOD-SP2): 18 ML/M2
BH CV ECHO MEAS - SVI(MOD-SP4): 21.4 ML/M2
BH CV ECHO MEAS - TAPSE (>1.6): 2.33 CM
BH CV ECHO MEAS - TR MAX PG: 17 MMHG
BH CV ECHO MEAS - TR MAX VEL: 204.3 CM/SEC
BH CV ECHO MEASUREMENTS AVERAGE E/E' RATIO: 8.02
BH CV VAS BP RIGHT ARM: NORMAL MMHG
BH CV XLRA - RV BASE: 3.1 CM
BH CV XLRA - RV LENGTH: 6.6 CM
BH CV XLRA - RV MID: 1.7 CM
BH CV XLRA - TDI S': 14.4 CM/SEC
CREAT BLDA-MCNC: 0.7 MG/DL (ref 0.6–1.3)
LEFT ATRIUM VOLUME INDEX: 20.2 ML/M2
LV EF 2D ECHO EST: 60 %
LV EF BIPLANE MOD: 54.2 %

## 2025-01-30 PROCEDURE — 75574 CT ANGIO HRT W/3D IMAGE: CPT | Performed by: INTERNAL MEDICINE

## 2025-01-30 PROCEDURE — 25510000001 IOPAMIDOL PER 1 ML: Performed by: INTERNAL MEDICINE

## 2025-01-30 PROCEDURE — 93306 TTE W/DOPPLER COMPLETE: CPT

## 2025-01-30 PROCEDURE — 82565 ASSAY OF CREATININE: CPT | Performed by: INTERNAL MEDICINE

## 2025-01-30 PROCEDURE — 93306 TTE W/DOPPLER COMPLETE: CPT | Performed by: INTERNAL MEDICINE

## 2025-01-30 PROCEDURE — 81025 URINE PREGNANCY TEST: CPT | Performed by: INTERNAL MEDICINE

## 2025-01-30 PROCEDURE — 75574 CT ANGIO HRT W/3D IMAGE: CPT

## 2025-01-30 RX ORDER — IVABRADINE 5 MG/1
15 TABLET, FILM COATED ORAL ONCE
Status: DISCONTINUED | OUTPATIENT
Start: 2025-01-30 | End: 2025-02-01 | Stop reason: HOSPADM

## 2025-01-30 RX ORDER — NITROGLYCERIN 0.4 MG/1
0.4 TABLET SUBLINGUAL
Status: COMPLETED | OUTPATIENT
Start: 2025-01-30 | End: 2025-01-30

## 2025-01-30 RX ORDER — METOPROLOL TARTRATE 1 MG/ML
5 INJECTION, SOLUTION INTRAVENOUS
Status: DISCONTINUED | OUTPATIENT
Start: 2025-01-30 | End: 2025-02-01 | Stop reason: HOSPADM

## 2025-01-30 RX ORDER — SODIUM CHLORIDE 0.9 % (FLUSH) 0.9 %
10 SYRINGE (ML) INJECTION AS NEEDED
Status: DISCONTINUED | OUTPATIENT
Start: 2025-01-30 | End: 2025-02-01 | Stop reason: HOSPADM

## 2025-01-30 RX ORDER — METOPROLOL TARTRATE 100 MG/1
100 TABLET ORAL ONCE
Status: DISCONTINUED | OUTPATIENT
Start: 2025-01-30 | End: 2025-02-01 | Stop reason: HOSPADM

## 2025-01-30 RX ORDER — SODIUM CHLORIDE 0.9 % (FLUSH) 0.9 %
10 SYRINGE (ML) INJECTION EVERY 12 HOURS SCHEDULED
Status: DISCONTINUED | OUTPATIENT
Start: 2025-01-30 | End: 2025-02-01 | Stop reason: HOSPADM

## 2025-01-30 RX ORDER — NITROGLYCERIN 0.4 MG/1
0.8 TABLET SUBLINGUAL
Status: COMPLETED | OUTPATIENT
Start: 2025-01-30 | End: 2025-01-30

## 2025-01-30 RX ORDER — SODIUM CHLORIDE 9 MG/ML
40 INJECTION, SOLUTION INTRAVENOUS AS NEEDED
Status: DISCONTINUED | OUTPATIENT
Start: 2025-01-30 | End: 2025-02-01 | Stop reason: HOSPADM

## 2025-01-30 RX ORDER — IOPAMIDOL 755 MG/ML
100 INJECTION, SOLUTION INTRAVASCULAR
Status: COMPLETED | OUTPATIENT
Start: 2025-01-30 | End: 2025-01-30

## 2025-01-30 RX ORDER — METOPROLOL TARTRATE 25 MG/1
25 TABLET, FILM COATED ORAL ONCE
Status: DISCONTINUED | OUTPATIENT
Start: 2025-01-30 | End: 2025-02-01 | Stop reason: HOSPADM

## 2025-01-30 RX ORDER — METOPROLOL TARTRATE 25 MG/1
50 TABLET, FILM COATED ORAL
Status: DISCONTINUED | OUTPATIENT
Start: 2025-01-30 | End: 2025-02-01 | Stop reason: HOSPADM

## 2025-01-30 RX ORDER — METOPROLOL TARTRATE 100 MG/1
200 TABLET ORAL ONCE
Status: DISCONTINUED | OUTPATIENT
Start: 2025-01-30 | End: 2025-02-01 | Stop reason: HOSPADM

## 2025-01-30 RX ORDER — METOPROLOL TARTRATE 25 MG/1
50 TABLET, FILM COATED ORAL ONCE
Status: DISCONTINUED | OUTPATIENT
Start: 2025-01-30 | End: 2025-02-01 | Stop reason: HOSPADM

## 2025-01-30 RX ADMIN — NITROGLYCERIN 0.8 MG: 0.4 TABLET SUBLINGUAL at 09:48

## 2025-01-30 RX ADMIN — IOPAMIDOL 65 ML: 755 INJECTION, SOLUTION INTRAVENOUS at 10:05

## 2025-02-11 ENCOUNTER — OFFICE VISIT (OUTPATIENT)
Dept: OBSTETRICS AND GYNECOLOGY | Facility: CLINIC | Age: 55
End: 2025-02-11
Payer: COMMERCIAL

## 2025-02-11 VITALS
HEIGHT: 68 IN | BODY MASS INDEX: 23.55 KG/M2 | DIASTOLIC BLOOD PRESSURE: 68 MMHG | SYSTOLIC BLOOD PRESSURE: 114 MMHG | WEIGHT: 155.4 LBS

## 2025-02-11 DIAGNOSIS — Z01.419 WOMEN'S ANNUAL ROUTINE GYNECOLOGICAL EXAMINATION: Primary | ICD-10-CM

## 2025-02-11 DIAGNOSIS — Z12.31 SCREENING MAMMOGRAM FOR BREAST CANCER: ICD-10-CM

## 2025-02-11 NOTE — PROGRESS NOTES
Gynecologic Annual Exam Note        GYN Annual Exam     CC - Here for annual exam.        HPI  Agnieszka Fontenot is a 54 y.o. female, , who presents for annual well woman exam as an established patient.  She is postmenopausal.. Denies vaginal bleeding.   There were no changes to her medical or surgical history since her last visit. Marital Status: .  She is not sexually active due to her 's health issues. She has not had new partners.. STD testing recommendations have been explained to the patient and she declines STD testing.    The patient would like to discuss the following complaints today: none. Denies menopausal symptoms.     Additional OB/GYN History   On HRT? No    Last Pap : 2022. Results: negative. HPV: negative.   Last Completed Pap Smear       This patient has no relevant Health Maintenance data.          History of abnormal Pap smear: no  Family history of uterine, colon, breast, or ovarian cancer: yes - pat aunt- breast cancer  Performs monthly Self-Breast Exam: no  Last mammogram: 2024. Done at . There is a copy in the chart.    Last Completed Mammogram            Ordered - MAMMOGRAM (Every 2 Years) Ordered on 2024  Mammo Screening Digital Tomosynthesis Bilateral With CAD    2022  Mammo Screening Digital Tomosynthesis Bilateral With CAD    2021  Mammo Diagnostic Digital Tomosynthesis Bilateral With CAD    08/10/2020  Mammo Diagnostic Left With CAD    2020  Mammo Diagnostic Digital Tomosynthesis Bilateral With CAD    Only the first 5 history entries have been loaded, but more history exists.                  Last colonoscopy: has had a colonoscopy 2 years ago    Last Completed Colonoscopy            COLORECTAL CANCER SCREENING (COLONOSCOPY - Every 3 Years) Next due on 2023  SCANNED - COLONOSCOPY                    Her last bone density scan was 2024 and results were Normal-- PCP ordered due to low  vitamin D  Exercises Regularly: yes  Feelings of Anxiety or Depression: no      Tobacco Usage?: No       Current Outpatient Medications:     ALLERGY SERUM INJECTION, Allergy  injections - monthly, Disp: , Rfl:     amLODIPine (NORVASC) 5 MG tablet, , Disp: , Rfl:     latanoprost (XALATAN) 0.005 % ophthalmic solution, Administer 1 drop to both eyes Every Night., Disp: , Rfl:     metFORMIN ER (GLUCOPHAGE-XR) 500 MG 24 hr tablet, Take 2 tablets by mouth Every Night., Disp: 60 tablet, Rfl: 0    montelukast (SINGULAIR) 10 MG tablet, Singulair 10 mg tablet  Daily, Disp: , Rfl:     multivitamin with minerals (Multivitamin Adults) tablet tablet, Take 1 tablet by mouth Daily., Disp: , Rfl:     omeprazole (priLOSEC) 40 MG capsule, TAKE 1 CAPSULE BY MOUTH TWICE A DAY 30 MIN BEFORE A MEAL (Patient taking differently: Daily.), Disp: 180 capsule, Rfl: 3    simvastatin (ZOCOR) 10 MG tablet, , Disp: , Rfl:     timolol (TIMOPTIC) 0.5 % ophthalmic solution, Administer 1 drop to both eyes Daily., Disp: , Rfl:     XIIDRA 5 % solution, , Disp: , Rfl:     Patient denies the need for medication refills today.    OB History          2    Para   2    Term   2       0    AB   0    Living   2         SAB   0    IAB   0    Ectopic   0    Molar   0    Multiple   0    Live Births   2                Past Medical History:   Diagnosis Date    Elevated cholesterol     Encounter for weight loss counseling 2024    GERD (gastroesophageal reflux disease)     History of colon polyps     History of degenerative disc disease     History of mammogram 2019-negative    Hypercholesterolemia     Hypertension     Indigestion     dx with dyspepsia; found to be caused by GERD    Kidney stone Mar 2015    Multiple gestation 99    Respiratory disorder     reactive airway disease    Scoliosis         Past Surgical History:   Procedure Laterality Date    BACK SURGERY      blood transfusion, Kevan Foster/ Dr. Meek Zee  "    SECTION  2001    Dr. Lopez    CHOLECYSTECTOMY  10/2011    no comp/ Dr. Suazo    ENDOSCOPY      EGD    MOUTH SURGERY      TONSILLECTOMY      UPPER GASTROINTESTINAL ENDOSCOPY      WISDOM TOOTH EXTRACTION         Health Maintenance   Topic Date Due    TDAP/TD VACCINES (1 - Tdap) Never done    HEPATITIS C SCREENING  Never done    ANNUAL PHYSICAL  Never done    Pneumococcal Vaccine 50+ (1 of 1 - PCV) Never done    COVID-19 Vaccine ( -  season) 2024    PAP SMEAR  2025    Annual Gynecologic Pelvic and Breast Exam  2025    LIPID PANEL  2025    MAMMOGRAM  2026    COLORECTAL CANCER SCREENING  2026    INFLUENZA VACCINE  Completed    ZOSTER VACCINE  Completed       The additional following portions of the patient's history were reviewed and updated as appropriate: allergies, current medications, past family history, past medical history, past social history, and past surgical history.    Review of Systems   Constitutional: Negative.    Respiratory: Negative.     Cardiovascular: Negative.    Gastrointestinal: Negative.    Genitourinary: Negative.    Psychiatric/Behavioral: Negative.         I have reviewed and agree with the HPI, ROS, and historical information as entered above. Yasmin Simpson, GRISELDA      Objective   /68   Ht 172.7 cm (68\")   Wt 70.5 kg (155 lb 6.4 oz)   BMI 23.63 kg/m²     Physical Exam  Vitals and nursing note reviewed. Exam conducted with a chaperone present.   Constitutional:       General: She is not in acute distress.     Appearance: Normal appearance. She is well-developed and normal weight. She is not ill-appearing.   Neck:      Thyroid: No thyroid mass or thyromegaly.   Pulmonary:      Effort: Pulmonary effort is normal. No respiratory distress or retractions.   Chest:      Chest wall: No mass.   Breasts:     Right: Normal. No mass, nipple discharge, skin change or tenderness.      Left: Normal. No mass, nipple discharge, skin change or " tenderness.   Abdominal:      General: There is no distension.      Palpations: Abdomen is soft. Abdomen is not rigid. There is no mass.      Tenderness: There is no abdominal tenderness. There is no guarding or rebound.      Hernia: No hernia is present.   Genitourinary:     General: Normal vulva.      Exam position: Lithotomy position.      Labia:         Right: No rash, tenderness or lesion.         Left: No rash, tenderness or lesion.       Vagina: Normal. No vaginal discharge, bleeding or lesions.      Cervix: Normal. No cervical motion tenderness, discharge, friability or cervical bleeding.      Uterus: Normal. Not enlarged, not fixed and not tender.       Adnexa: Right adnexa normal and left adnexa normal.        Right: No mass or tenderness.          Left: No mass or tenderness.        Rectum: Normal. No external hemorrhoid.   Musculoskeletal:      Cervical back: No muscular tenderness.   Skin:     General: Skin is warm and dry.   Neurological:      Mental Status: She is alert and oriented to person, place, and time.   Psychiatric:         Mood and Affect: Mood normal.         Behavior: Behavior normal.            Assessment and Plan    Problem List Items Addressed This Visit    None  Visit Diagnoses       Women's annual routine gynecological examination    -  Primary    Relevant Orders    LIQUID-BASED PAP SMEAR WITH HPV GENOTYPING REGARDLESS OF INTERPRETATION ([facility])    Screening mammogram for breast cancer        Relevant Orders    Mammo Screening Digital Tomosynthesis Bilateral With CAD            GYN annual well woman exam. Pap obtained today.   Reviewed monthly self breast exams.  Instructed to call with lumps, pain, or breast discharge.  Yearly mammograms ordered.  Ordered mammogram today.  Recommended use of Vitamin D and getting adequate calcium in her diet. (1500mg)  Symptoms of menopausal transition reviewed with patient.  Reviewed risks/benefits of OTC, non-hormonal and hormonal treatment  for vasomotor and other menopausal symptoms.  RTC in 1 year or PRN with problems.  Return in about 1 year (around 2/11/2026) for Annual physical.         Yasmin Simpson, GRISELDA  02/11/2025

## 2025-02-12 LAB — REF LAB TEST METHOD: NORMAL

## 2025-02-25 DIAGNOSIS — R73.03 PREDIABETES: ICD-10-CM

## 2025-02-25 RX ORDER — METFORMIN HYDROCHLORIDE 500 MG/1
1000 TABLET, EXTENDED RELEASE ORAL NIGHTLY
Qty: 180 TABLET | Refills: 0 | Status: SHIPPED | OUTPATIENT
Start: 2025-02-25 | End: 2025-05-26

## 2025-02-25 NOTE — TELEPHONE ENCOUNTER
Rx Refill Note  Requested Prescriptions     Pending Prescriptions Disp Refills    metFORMIN ER (GLUCOPHAGE-XR) 500 MG 24 hr tablet 180 tablet 0     Sig: Take 2 tablets by mouth Every Night for 90 days.      Patient requesting 90 day fill

## 2025-03-12 ENCOUNTER — HOSPITAL ENCOUNTER (OUTPATIENT)
Facility: HOSPITAL | Age: 55
Discharge: HOME | End: 2025-03-12
Admitting: ADVANCED PRACTICE MIDWIFE
Payer: COMMERCIAL

## 2025-03-12 DIAGNOSIS — Z12.31 SCREENING MAMMOGRAM FOR BREAST CANCER: ICD-10-CM

## 2025-03-12 LAB
NCCN CRITERIA FLAG: NORMAL
TYRER CUZICK SCORE: 13.1

## 2025-03-12 PROCEDURE — 77063 BREAST TOMOSYNTHESIS BI: CPT

## 2025-03-12 PROCEDURE — 77067 SCR MAMMO BI INCL CAD: CPT

## 2025-03-14 NOTE — PROGRESS NOTES
[facility]  [address]  [place] [address]     Office Note      Date: 2025  Patient Name: Agnieszka Fontenot  MRN: [id number]  : 1970    Subjective     Chief Complaint  Obesity Management follow-up          Agnieszka Fontenot presents to [facility] for obesity management.   Patient is satisfied with weight loss progress. Appetite is moderately controlled. On metformin. Reports no side effects of prescribed medications today. The patient is taking multivitamin and is not taking fish oil.  The patient is not using a food journal.  She feels she has been able to maintain good habits with healthy eating and exercise. She does not feel that she needs help with anything at this time.       The patient is exercising with a FITT score of:    Frequency Intensity Time Strength Training   []   0, none []   0 []   0 [x]   0   []   1 (1-2x/week) [x]   1 (light) []   1 (<10 min) []   1 (1x/week)   [x]   2 (3-5x/week) []   2 (moderate) []   2 (10-20 min) []   2 (2x/week)   []   3 (daily) []   3 (moderately hard)  []   4 (very hard) [x]   3 (20-30 min)  []   4 (>30 min) []   3 (3-4x/week)       Review of Systems   Constitutional:  Negative for appetite change and fatigue.   Eyes:  Negative for visual disturbance.   Cardiovascular:  Negative for chest pain and palpitations.   Gastrointestinal:  Negative for constipation and indigestion.   Neurological:  Negative for light-headedness.         Current Outpatient Medications:     ALLERGY SERUM INJECTION, Allergy  injections - monthly, Disp: , Rfl:     amLODIPine (NORVASC) 5 MG tablet, , Disp: , Rfl:     latanoprost (XALATAN) 0.005 % ophthalmic solution, Administer 1 drop to both eyes Every Night., Disp: , Rfl:     metFORMIN ER (GLUCOPHAGE-XR) 500 MG 24 hr tablet, Take 2 tablets by mouth Every Night for 90 days., Disp: 180 tablet, Rfl: 0    montelukast (SINGULAIR) 10 MG tablet, Singulair 10 mg tablet   "Daily, Disp: , Rfl:     multivitamin with minerals (Multivitamin Adults) tablet tablet, Take 1 tablet by mouth Daily., Disp: , Rfl:     omeprazole (priLOSEC) 40 MG capsule, TAKE 1 CAPSULE BY MOUTH TWICE A DAY 30 MIN BEFORE A MEAL (Patient taking differently: Daily.), Disp: 180 capsule, Rfl: 3    simvastatin (ZOCOR) 10 MG tablet, , Disp: , Rfl:     timolol (TIMOPTIC) 0.5 % ophthalmic solution, Administer 1 drop to both eyes Daily., Disp: , Rfl:     XIIDRA 5 % solution, , Disp: , Rfl:     Objective   Start weight: 170 pounds.    Total Loss lb/%Loss of beginning body weight (BBW): -19 lb/-11.18 %  Change in weight since last visit: -1.8 lb    Recent Weight History:   Wt Readings from Last 6 Encounters:   03/21/25 68.5 kg (151 lb)   02/11/25 70.5 kg (155 lb 6.4 oz)   01/30/25 69.3 kg (152 lb 12.5 oz)   01/30/25 70.2 kg (154 lb 14 oz)   01/21/25 69.3 kg (152 lb 12.8 oz)   01/06/25 72.1 kg (159 lb)       Body mass index is 22.96 kg/m².   Body composition analysis completed and showed:   Body Fat %: 36.9%    Measurements (in inches)  Waist Circumference: 39.5      Vital Signs:   /78   Pulse 69   Resp 18   Ht 172.7 cm (68\")   Wt 68.5 kg (151 lb)   SpO2 98%   BMI 22.96 kg/m²     Physical Exam   General appears stated age and normal appearance   HEENT    Chest/lungs Normal rate and Breathing is unlabored   Extremities    Neuro Good historian and No focal deficit   Skin    Psych normal behavior, normal thought content, and normal concentration       Common labs          4/24/2024    10:37 9/18/2024    09:01 1/30/2025    09:30   Common Labs   Glucose 88      BUN 10      Creatinine 0.73   0.70    Sodium 141      Potassium 4.2      Chloride 102      Calcium 10.2      Albumin 4.6      Total Bilirubin 0.5      Alkaline Phosphatase 106      AST (SGOT) 20      ALT (SGPT) 18      WBC 5.1      Hemoglobin 14.4      Hematocrit 44.3      Platelets 270      Total Cholesterol 176      Triglycerides 97      HDL Cholesterol 58    "   LDL Cholesterol  100      Hemoglobin A1C 6.1  5.70       TSH          4/24/2024    10:37   TSH   TSH 3.430               Assessment / Plan        Diagnoses and all orders for this visit:    1. Encounter for weight loss counseling (Primary)  Overview:  Start weight: (170): 1st goal (10% weight loss): 153   Target Goal: 154 (reached 1/2025)    Current medication prescribed by [facility]: Metformin 500bid  AOM excluded from plan    Rx Options: All (OK per ophthalmology); She is set on lifestyle modifications  Rx Caution: Topamax (Hx kidney stone), elevated eye pressure at risk for glaucoma (clearance OK from Dr. Matt Palomares ophthalmologist for P37, Wellbutrin, Topamax)    Comorbid conditions: HTN,HLD, GERD, IBS, Pre DM  Food: Struggles: sweets, carbs james breakfast/Lack of sleep cares for  with hemiparalysis from stroke through the night  Exercise: Treadmill 20 min about 5-7 days week  Personal goals/motivations: To feel better, clothes fit better    Pancreatitis: No   Glaucoma: No, however eye pressures are elevated, at risk for glaucoma  Headaches: Yes, infrequent   HTN:  Yes  Heart palpitations: No  Thyroid C cell cancer: No  MEN syndrome personal or family hx: No  Nephrolithiasis: Yes, 2015, lithotripsy, isolated event    Macronutrient daily goals:  Protein 100 g/day  Calories 9465-1354/day  Net carbs- 50-75/day  Water 85 oz/ day    Assessment & Plan:  Patient's (Body mass index is 22.96 kg/m².) indicates that they are within normal weight  with a history of being overweight/obesity, with health conditions that include hypertension, dyslipidemias, and GERD . Weight is improving with treatment. BMI is is above average; BMI management plan is completed. We discussed portion control, increasing exercise, pharmacologic options including metformin, and an velia-based approach such as [employer] Pal or Lose It.     I have instructed the patient to continue with pursuit of medical weight loss as a part of this program.  Patient does meet criteria for use of anorectics at this time as hyperlipidemia, hypertension, impaired fasting glucose, and this medication is indicated for LONG TERM use for management of obesity.     The current plan for this month includes:   - 2 month f/u visit, she is down 1 lb, on metformin and with lifestyle modifications  - Continue current exercise efforts  - Continue to work on lifestyle behavioral changes  - Continue to prioritize protein, fiber, and hydration.   - continue metformin for pre DM, IR  - target weight goal has been met, normal BMI at this time. Continue with maintenance weight management at this time.         2. Essential hypertension  Assessment & Plan:  Hypertension is stable and controlled  Continue current treatment regimen.  Dietary sodium restriction.  Regular aerobic exercise.  Blood pressure will be reassessed  at next visit .      3. Prediabetes  Overview:  A1C 5.7 (9/2024); A1C 6.1 4/2024 total insulin 10.2    Assessment & Plan:  Low carb diet, regular physical activity, and weight reduction of 10-15%.   Continue metformin            Follow Up   Return in about 3 months (around 6/21/2025).  Patient was given instructions and counseling regarding her condition or for health maintenance advice. Please see specific information pulled into the AVS if appropriate.     Clara Del Toro, APRN  03/21/2025

## 2025-03-21 ENCOUNTER — OFFICE VISIT (OUTPATIENT)
Dept: BARIATRICS/WEIGHT MGMT | Facility: CLINIC | Age: 55
End: 2025-03-21
Payer: COMMERCIAL

## 2025-03-21 VITALS
BODY MASS INDEX: 22.88 KG/M2 | OXYGEN SATURATION: 98 % | HEIGHT: 68 IN | HEART RATE: 69 BPM | DIASTOLIC BLOOD PRESSURE: 78 MMHG | SYSTOLIC BLOOD PRESSURE: 124 MMHG | WEIGHT: 151 LBS | RESPIRATION RATE: 18 BRPM

## 2025-03-21 DIAGNOSIS — I10 ESSENTIAL HYPERTENSION: ICD-10-CM

## 2025-03-21 DIAGNOSIS — R73.03 PREDIABETES: ICD-10-CM

## 2025-03-21 DIAGNOSIS — Z71.3 ENCOUNTER FOR WEIGHT LOSS COUNSELING: Primary | ICD-10-CM

## 2025-03-21 NOTE — ASSESSMENT & PLAN NOTE
Hypertension is stable and controlled  Continue current treatment regimen.  Dietary sodium restriction.  Regular aerobic exercise.  Blood pressure will be reassessed  at next visit .

## 2025-03-21 NOTE — ASSESSMENT & PLAN NOTE
Patient's (Body mass index is 22.96 kg/m².) indicates that they are within normal weight  with a history of being overweight/obesity, with health conditions that include hypertension, dyslipidemias, and GERD . Weight is improving with treatment. BMI is is above average; BMI management plan is completed. We discussed portion control, increasing exercise, pharmacologic options including metformin, and an velia-based approach such as [employer] Pal or Lose It.     I have instructed the patient to continue with pursuit of medical weight loss as a part of this program. Patient does meet criteria for use of anorectics at this time as hyperlipidemia, hypertension, impaired fasting glucose, and this medication is indicated for LONG TERM use for management of obesity.     The current plan for this month includes:   - 2 month f/u visit, she is down 1 lb, on metformin and with lifestyle modifications  - Continue current exercise efforts  - Continue to work on lifestyle behavioral changes  - Continue to prioritize protein, fiber, and hydration.   - continue metformin for pre DM, IR  - target weight goal has been met, normal BMI at this time. Continue with maintenance weight management at this time.

## 2025-06-23 NOTE — PROGRESS NOTES
[facility]  [address]  [place] [address]     Office Note      Date: 2025  Patient Name: Agnieszka Fontenot  MRN: [id number]  : 1970    Subjective     Chief Complaint  Obesity Management follow-up          Agnieszka Fontenot presents to [facility] for obesity management.   Patient is satisfied with weight loss progress. Appetite is moderately controlled. On Metformin only.  Reports no side effects of prescribed medications today. The patient is taking multivitamin and is not taking fish oil.  The patient is not using a food journal.  She admits to eating more sweets recently with graduations and gatherings. She had labs recently with PCP. She has had more stress at work recently and admits to some indigestion. She recently started vitamin D.       The patient is exercising, walking, with a FITT score of:    Frequency Intensity Time Strength Training   []   0, none []   0 []   0 [x]   0   []   1 (1-2x/week) [x]   1 (light) []   1 (<10 min) []   1 (1x/week)   [x]   2 (3-5x/week) []   2 (moderate) []   2 (10-20 min) []   2 (2x/week)   []   3 (daily) []   3 (moderately hard)  []   4 (very hard) [x]   3 (20-30 min)  []   4 (>30 min) []   3 (3-4x/week)       Review of Systems   Constitutional:  Negative for appetite change and fatigue.   Eyes:  Negative for visual disturbance.   Cardiovascular:  Negative for chest pain and palpitations.   Gastrointestinal:  Negative for constipation and indigestion.   Neurological:  Negative for light-headedness.   All other systems reviewed and are negative.        Current Outpatient Medications:     ALLERGY SERUM INJECTION, Allergy  injections - monthly, Disp: , Rfl:     amLODIPine (NORVASC) 5 MG tablet, , Disp: , Rfl:     ergocalciferol (ERGOCALCIFEROL) 1.25 MG (21654 UT) capsule, Take 1 capsule by mouth Every 14 (Fourteen) Days., Disp: , Rfl:     latanoprost (XALATAN) 0.005 % ophthalmic solution,  "Administer 1 drop to both eyes Every Night., Disp: , Rfl:     metFORMIN ER (GLUCOPHAGE-XR) 500 MG 24 hr tablet, Take 2 tablets by mouth Every Night for 90 days., Disp: 180 tablet, Rfl: 0    montelukast (SINGULAIR) 10 MG tablet, Singulair 10 mg tablet  Daily, Disp: , Rfl:     multivitamin with minerals (Multivitamin Adults) tablet tablet, Take 1 tablet by mouth Daily., Disp: , Rfl:     omeprazole (priLOSEC) 40 MG capsule, TAKE 1 CAPSULE BY MOUTH TWICE A DAY 30 MIN BEFORE A MEAL (Patient taking differently: Daily.), Disp: 180 capsule, Rfl: 3    simvastatin (ZOCOR) 10 MG tablet, , Disp: , Rfl:     timolol (TIMOPTIC) 0.5 % ophthalmic solution, Administer 1 drop to both eyes Daily., Disp: , Rfl:     XIIDRA 5 % solution, , Disp: , Rfl:     Objective   Start weight: 170 pounds.    Total Loss lb/%Loss of beginning body weight (BBW): -17 lb/-10%  Change in weight since last visit: +2    Recent Weight History:   Wt Readings from Last 6 Encounters:   06/24/25 69.4 kg (153 lb)   03/21/25 68.5 kg (151 lb)   02/11/25 70.5 kg (155 lb 6.4 oz)   01/30/25 69.3 kg (152 lb 12.5 oz)   01/30/25 70.2 kg (154 lb 14 oz)   01/21/25 69.3 kg (152 lb 12.8 oz)       Body mass index is 23.26 kg/m².   Body composition analysis completed and showed:   Body Fat %: 38.0    Measurements (in inches)  Waist Circumference: 41.5      Vital Signs:   /76 (BP Location: Left arm, Patient Position: Sitting)   Pulse 77   Resp 16   Ht 172.7 cm (68\")   Wt 69.4 kg (153 lb)   SpO2 99%   BMI 23.26 kg/m²     Physical Exam   General appears stated age and normal appearance   HEENT    Chest/lungs Normal rate, Regular rhythm, and Breathing is unlabored   Extremities    Neuro Good historian and No focal deficit   Skin    Psych normal behavior, normal thought content, and normal concentration       Common labs          9/18/2024    09:01 1/30/2025    09:30   Common Labs   Creatinine  0.70    Hemoglobin A1C 5.70                  Assessment / Plan      "   Diagnoses and all orders for this visit:    1. Encounter for weight loss counseling (Primary)  Overview:  Start weight: (170): 1st goal (10% weight loss): 153   Target Goal: 154 (reached 1/2025)    Current medication prescribed by [facility]WM: Metformin 500bid  AOM excluded from plan    Rx Options: All (OK per ophthalmology); She is set on lifestyle modifications  Rx Caution: Topamax (Hx kidney stone), elevated eye pressure at risk for glaucoma (clearance OK from Dr. Matt Palomares ophthalmologist for P37, Wellbutrin, Topamax)    Comorbid conditions: HTN,HLD, GERD, IBS, Pre DM  Food: Struggles: sweets, carbs james breakfast/Lack of sleep cares for  with hemiparalysis from stroke through the night  Exercise: Treadmill 20 min about 5-7 days week  Personal goals/motivations: To feel better, clothes fit better    Pancreatitis: No   Glaucoma: No, however eye pressures are elevated, at risk for glaucoma  Headaches: Yes, infrequent   HTN:  Yes  Heart palpitations: No  Thyroid C cell cancer: No  MEN syndrome personal or family hx: No  Nephrolithiasis: Yes, 2015, lithotripsy, isolated event    Macronutrient daily goals:  Protein 100 g/day  Calories 3226-9447/day  Net carbs- 50-75/day  Water 85 oz/ day    Assessment & Plan:  Patient's (Body mass index is 23.26 kg/m².) indicates that they are within normal weight  with a history of being overweight/obesity, with health conditions that include hypertension, impaired fasting glucose, and dyslipidemias . Weight is worsening. BMI is is above average; BMI management plan is completed. We discussed portion control, increasing exercise, pharmacologic options including metformin, and an velia-based approach such as [employer] Pal or Lose It.     I have instructed the patient to continue with pursuit of medical weight loss as a part of this program. Patient does meet criteria for use of anorectics at this time as hyperlipidemia and hypertension.     The current plan for this month includes:    - 3 month maintenance f/u visit, she is up 2 lbs on metformin and with lifestyle only  - Continue current exercise efforts  - Continue to work on lifestyle behavioral changes  - Continue nutrition focus  - Continue to prioritize protein, fiber, and hydration.   - continue metformin at this time for IR  - target weight loss goal 154 lb has been met. Continue weight maintenance at this time      Great work maintaining goal weight. Continue working on keeping carb intake low and increasing fiber intake over the next few months.       2. Essential hypertension  Assessment & Plan:  Hypertension is stable and controlled  Continue current treatment regimen.  Weight loss.  Regular aerobic exercise.  Blood pressure will be reassessed in 6 months.      3. Prediabetes  Overview:  A1C 5.7 (9/2024); A1C 6.1 4/2024 total insulin 10.2    Assessment & Plan:  Continue low carb diet, regular physical activity, and weight reduction of 10-15%.       Orders:  -     metFORMIN ER (GLUCOPHAGE-XR) 500 MG 24 hr tablet; Take 2 tablets by mouth Every Night for 90 days.  Dispense: 180 tablet; Refill: 0          Follow Up   Return in about 3 months (around 9/24/2025).  Patient was given instructions and counseling regarding her condition or for health maintenance advice. Please see specific information pulled into the AVS if appropriate.     Clara Del Toro, APRN  06/24/2025

## 2025-06-24 ENCOUNTER — OFFICE VISIT (OUTPATIENT)
Dept: BARIATRICS/WEIGHT MGMT | Facility: CLINIC | Age: 55
End: 2025-06-24
Payer: COMMERCIAL

## 2025-06-24 VITALS
RESPIRATION RATE: 16 BRPM | OXYGEN SATURATION: 99 % | DIASTOLIC BLOOD PRESSURE: 76 MMHG | SYSTOLIC BLOOD PRESSURE: 118 MMHG | BODY MASS INDEX: 23.19 KG/M2 | HEIGHT: 68 IN | HEART RATE: 77 BPM | WEIGHT: 153 LBS

## 2025-06-24 DIAGNOSIS — I10 ESSENTIAL HYPERTENSION: ICD-10-CM

## 2025-06-24 DIAGNOSIS — R73.03 PREDIABETES: ICD-10-CM

## 2025-06-24 DIAGNOSIS — Z71.3 ENCOUNTER FOR WEIGHT LOSS COUNSELING: Primary | ICD-10-CM

## 2025-06-24 PROCEDURE — 99214 OFFICE O/P EST MOD 30 MIN: CPT

## 2025-06-24 RX ORDER — ERGOCALCIFEROL 1.25 MG/1
50000 CAPSULE ORAL
COMMUNITY
Start: 2025-06-20

## 2025-06-24 RX ORDER — METFORMIN HYDROCHLORIDE 500 MG/1
1000 TABLET, EXTENDED RELEASE ORAL NIGHTLY
Qty: 180 TABLET | Refills: 0 | Status: SHIPPED | OUTPATIENT
Start: 2025-06-24 | End: 2025-09-22

## 2025-06-24 NOTE — ASSESSMENT & PLAN NOTE
Patient's (Body mass index is 23.26 kg/m².) indicates that they are within normal weight  with a history of being overweight/obesity, with health conditions that include hypertension, impaired fasting glucose, and dyslipidemias . Weight is worsening. BMI is is above average; BMI management plan is completed. We discussed portion control, increasing exercise, pharmacologic options including metformin, and an velia-based approach such as [employer] Pal or Lose It.     I have instructed the patient to continue with pursuit of medical weight loss as a part of this program. Patient does meet criteria for use of anorectics at this time as hyperlipidemia and hypertension.     The current plan for this month includes:   - 3 month maintenance f/u visit, she is up 2 lbs on metformin and with lifestyle only  - Continue current exercise efforts  - Continue to work on lifestyle behavioral changes  - Continue nutrition focus  - Continue to prioritize protein, fiber, and hydration.   - continue metformin at this time for IR  - target weight loss goal 154 lb has been met. Continue weight maintenance at this time      Great work maintaining goal weight. Continue working on keeping carb intake low and increasing fiber intake over the next few months.